# Patient Record
Sex: FEMALE | Race: WHITE | NOT HISPANIC OR LATINO
[De-identification: names, ages, dates, MRNs, and addresses within clinical notes are randomized per-mention and may not be internally consistent; named-entity substitution may affect disease eponyms.]

---

## 2021-12-13 PROBLEM — Z00.00 ENCOUNTER FOR PREVENTIVE HEALTH EXAMINATION: Status: ACTIVE | Noted: 2021-12-13

## 2021-12-17 ENCOUNTER — APPOINTMENT (OUTPATIENT)
Dept: ORTHOPEDIC SURGERY | Facility: CLINIC | Age: 61
End: 2021-12-17
Payer: COMMERCIAL

## 2021-12-17 DIAGNOSIS — M79.662 PAIN IN LEFT LOWER LEG: ICD-10-CM

## 2021-12-17 DIAGNOSIS — Z87.09 PERSONAL HISTORY OF OTHER DISEASES OF THE RESPIRATORY SYSTEM: ICD-10-CM

## 2021-12-17 DIAGNOSIS — Z78.9 OTHER SPECIFIED HEALTH STATUS: ICD-10-CM

## 2021-12-17 PROCEDURE — 73564 X-RAY EXAM KNEE 4 OR MORE: CPT | Mod: RT,LT

## 2021-12-17 PROCEDURE — 99204 OFFICE O/P NEW MOD 45 MIN: CPT

## 2021-12-17 PROCEDURE — 73590 X-RAY EXAM OF LOWER LEG: CPT | Mod: LT

## 2021-12-17 RX ORDER — CHROMIUM 200 MCG
TABLET ORAL
Refills: 0 | Status: ACTIVE | COMMUNITY

## 2021-12-17 RX ORDER — MONTELUKAST 10 MG/1
TABLET, FILM COATED ORAL
Refills: 0 | Status: ACTIVE | COMMUNITY

## 2021-12-17 RX ORDER — CELECOXIB 50 MG/1
CAPSULE ORAL
Refills: 0 | Status: ACTIVE | COMMUNITY

## 2021-12-17 RX ORDER — BUDESONIDE AND FORMOTEROL FUMARATE DIHYDRATE 160; 4.5 UG/1; UG/1
AEROSOL RESPIRATORY (INHALATION)
Refills: 0 | Status: ACTIVE | COMMUNITY

## 2021-12-17 RX ORDER — FAMOTIDINE 40 MG/1
40 TABLET, FILM COATED ORAL
Refills: 0 | Status: ACTIVE | COMMUNITY

## 2021-12-17 NOTE — HISTORY OF PRESENT ILLNESS
[de-identified] : 60yo Woman comes in with pain in her left lower leg and knee that got really bad after a trip and fall on the left knee that occurred about 2 months ago.  Prior to that she has been treated for left knee arthritis and did have a steroid injection done in August and then she had another one done in October after the fall.  She has ongoing pain.  She did have x-rays of the knee in October but never of the lower leg.  There was a lot of bruising and swelling.  Her pain is sharp, achy, burning and cramping about 9-10 out of 10 worse with stairs.  She gets spasms.  Bending is very painful.  She had been prescribed celecoxib.  Her right knee has been hurting more from favoring the left.

## 2021-12-17 NOTE — PHYSICAL EXAM
[de-identified] : Bilateral lower extremities\par Antalgic gait walking on a flexed left knee and valgus malalignment.\par Left knee range of motion is with about a 15 degree flexion contracture and flexion to 90 degrees with pain.\par Right knee range of motion is from almost full extension to 125 to 130 degrees flexion without significant pain\par Tender medial and lateral joint lines left knee.\par There is generalized tenderness through her left lower leg including the fibula starting about 9 to 10 cm above the tip.\par No pitting edema but there may be mild edema through the lower leg.  No lower leg deformity or crepitus.\par  [de-identified] : \par X-rays bilateral knees weightbearing 4 views AP, lateral, merchant and Avila view showed more severe arthritis in the right than the left knee, bone-on-bone patellofemoral right and more moderate left and medial and lateral joint productive changes greater right than left.  No fractures or acute changes seen\par \par X-rays left lower leg tib-fib AP lateral views showed no visible fractures.  Ankle joint is unremarkable.

## 2021-12-17 NOTE — ASSESSMENT
[FreeTextEntry1] : 62 yo woman fell a couple months ago with pain left knee and loss of motion.  She has a history of arthritis which on x-ray looks more severe in her right than the left knee but clinically her left knee motion is very limited and painful now.  I suspect perhaps there is a displaced meniscus tear.  I referred her for an MRI.  I recommended she walk with a cane.  Heat and ice.  She can take the celecoxib once a day and 2 Tylenol once or twice a day if needed.  She was referred to physical therapy.  Heat and ice as needed.  She was given home exercises to work on range of motion and strengthening.  I will call her with the MRI results and I would like to see her back in about 3 to 4 weeks to see if she is making any progress.\par Since she has had several steroid injections, including 1 after the fall I did not want to do more steroids today since it had not helped.  Depending on the MRI findings we may consider a steroid injection versus possible surgery if there is a displaced tear of the meniscus blocking motion.

## 2021-12-25 ENCOUNTER — NON-APPOINTMENT (OUTPATIENT)
Age: 61
End: 2021-12-25

## 2021-12-28 ENCOUNTER — NON-APPOINTMENT (OUTPATIENT)
Age: 61
End: 2021-12-28

## 2022-01-14 ENCOUNTER — APPOINTMENT (OUTPATIENT)
Dept: ORTHOPEDIC SURGERY | Facility: CLINIC | Age: 62
End: 2022-01-14
Payer: COMMERCIAL

## 2022-01-14 PROCEDURE — 20610 DRAIN/INJ JOINT/BURSA W/O US: CPT | Mod: LT

## 2022-01-14 PROCEDURE — 99214 OFFICE O/P EST MOD 30 MIN: CPT | Mod: 25

## 2022-01-14 NOTE — PROCEDURE
[Injection] : Injection [Left] : of the left [Knee Joint] : knee joint [Osteoarthritis] : Osteoarthritis [Inflammation] : Inflammation [Joint Pain] : Joint pain [Patient] : patient [Risk] : risk [Benefits] : benefits [Alternatives] : alternatives [Bleeding] : bleeding [Infection] : infection [Allergic Reaction] : allergic reaction [Verbal Consent Obtained] : verbal consent was obtained prior to the procedure [Alcohol] : Alcohol [Betadine] : Betadine [Ethyl Chloride Spray] : ethyl chloride spray was used as a topical anesthetic [22] : a 22-gauge [1% Lidocaine___(mL)] : [unfilled] mL of 1% Lidocaine [Triamcinolone 40mg/mL___(mL)] : [unfilled] ~UmL of 40mg/mL triamcinolone [Bandage Applied] : a bandage [Tolerated Well] : The patient tolerated the procedure well [None] : none [No Strenuous Activity___day(s)] : avoid strenuous activity for [unfilled] day(s)

## 2022-02-04 ENCOUNTER — APPOINTMENT (OUTPATIENT)
Dept: ORTHOPEDIC SURGERY | Facility: CLINIC | Age: 62
End: 2022-02-04
Payer: COMMERCIAL

## 2022-02-10 ENCOUNTER — APPOINTMENT (OUTPATIENT)
Dept: ORTHOPEDIC SURGERY | Facility: CLINIC | Age: 62
End: 2022-02-10
Payer: COMMERCIAL

## 2022-02-10 PROCEDURE — 99213 OFFICE O/P EST LOW 20 MIN: CPT

## 2022-02-10 NOTE — ASSESSMENT
[FreeTextEntry1] : 61 yo woman fell 4 months ago with pain left knee and loss of motion.  There is osteoarthritis left knee with severe degenerative changes patellofemoral joint and also areas of lateral full-thickness cartilage wear and mucoid degeneration ACL and a complex tear of the lateral meniscus and small tear in the medial meniscus.\par She has more severe arthritis in the right knee.  \par She is not sure if she has had a chronic flexion contracture or if this just started several months ago.\par There has been some improvement.  I encouraged her to continue working on the knee motion particularly getting it straighter.  Pain seems less and she is able to work on it better.  The MRI did not seem to show anything that would be blocking motion.\par She can take Tylenol and ibuprofen or another anti-inflammatory as needed.\par With the arthritis I ordered hyaluronic acid injection to see if this would give her better relief from the arthritis   And help improve her motion and function.\par We will work on authorizing that and she will follow-up in about a month.

## 2022-02-10 NOTE — PHYSICAL EXAM
[de-identified] : Bilateral lower extremities\par Antalgic gait walking on a flexed left knee and valgus malalignment.  Flexion contracture left knee\par Left knee range of motion is with about a 10-15 degree flexion contracture and flexion to 120 degrees with discomfort but much less pain\par Right knee range of motion is from almost full extension to 125 to 130 degrees flexion without significant pain\par Tender mildly medial and lateral joint lines left knee less than it had been.\par Minimally tender left calf\par No pitting edema but there may be mild edema through the lower leg.  No lower leg deformity or crepitus.  Normal varus and valgus laxity.  Ia Lachman.\par Mild pain with Reese left knee.\par Motor and sensation are intact distally [de-identified] : Varicose veins [de-identified] : Overweight [de-identified] : \par X-rays bilateral knees weightbearing 4 views AP, lateral, merchant and Avila view showed more severe arthritis in the right than the left knee, bone-on-bone patellofemoral right and more moderate left and medial and lateral joint productive changes greater right than left.  No fractures or acute changes seen\par \par X-rays left lower leg tib-fib AP lateral views showed no visible fractures.  Ankle joint is unremarkable.\par \par MRI of the left knee performed December 23, 2021 showed severe lateral patellofemoral arthritis, severe mucoid degeneration ACL, small complex tear posterior horn medial meniscus, complex tear posterior horn and body of the lateral meniscus, 10 mm area of severe chondral loss posterior weightbearing lateral femoral condyle, moderate popliteal cyst and small joint effusion.

## 2022-02-10 NOTE — PHYSICAL EXAM
[LE] : Sensory: Intact in bilateral lower extremities [Normal RLE] : Right Lower Extremity: No scars, rashes, lesions, ulcers, skin intact [Normal LLE] : Left Lower Extremity: No scars, rashes, lesions, ulcers, skin intact [Normal Touch] : sensation intact for touch [Normal] : Alert and in no acute distress [de-identified] : Bilateral lower extremities\par Antalgic gait walking on a flexed left knee and valgus malalignment.  Flexion contracture left knee\par Left knee range of motion is with about a 15-20 degree flexion contracture and flexion to 90 degrees with pain.\par Right knee range of motion is from almost full extension to 125 to 130 degrees flexion without significant pain\par Tender medial and lateral joint lines left knee.\par There is generalized tenderness through her left calf but she also has some right calf pain and just seems to be tender in that area.\par No pitting edema but there may be mild edema through the lower leg.  No lower leg deformity or crepitus.  Normal varus and valgus laxity.  Ia Lachman.\par Mild pain with Reese left knee. [de-identified] : Varicose veins [de-identified] : Overweight [de-identified] : \par X-rays bilateral knees weightbearing 4 views AP, lateral, merchant and Avila view showed more severe arthritis in the right than the left knee, bone-on-bone patellofemoral right and more moderate left and medial and lateral joint productive changes greater right than left.  No fractures or acute changes seen\par \par X-rays left lower leg tib-fib AP lateral views showed no visible fractures.  Ankle joint is unremarkable.\par \par MRI of the left knee performed December 23, 2021 showed severe lateral patellofemoral arthritis, severe mucoid degeneration ACL, small complex tear posterior horn medial meniscus, complex tear posterior horn and body of the lateral meniscus, 10 mm area of severe chondral loss posterior weightbearing lateral femoral condyle, moderate popliteal cyst and small joint effusion.

## 2022-02-10 NOTE — HISTORY OF PRESENT ILLNESS
[de-identified] : 61 yo Woman comes in with history of bilateral knee osteoarthritis right greater than left comes in for f/u. Steroid injection was done in the left knee 3 wks ago which helped and pain is somewhat less.. \par She went to physical therapy.  \par She feels there has been some improvement.

## 2022-02-10 NOTE — ASSESSMENT
[FreeTextEntry1] : 61 yo woman fell 3 months ago with pain left knee and loss of motion.  There is osteoarthritis left knee with severe degenerative changes patellofemoral joint and also areas of lateral full-thickness cartilage wear and mucoid degeneration ACL and a complex tear of the lateral meniscus and small tear in the medial meniscus.\par She has more severe arthritis in the right knee.  What is most concerning is the loss of motion both flexion contracture and loss of flexion but particularly the inability to fully straighten her knee.  She has had 2 other cortisone injections in the last 6 months prior to seeing me and had a couple of few years ago.  Given her lack of motion without an obvious tear or something blocking her motion I recommended trying 1 more injection of steroids which we did today.  Following the injection she was able to extend her knee another 5 degrees and had much less pain.  She could walk better as well.  Hopefully this allows her to rehabilitate her knee better with the therapy and I encouraged her to do exercises every day on her own working on getting the knee to go as straight as possible and to do leg lifts to strengthen.\par If she has ongoing pain and loss of motion we may consider arthroscopic surgery.  Also given the arthritis and the fact she has had 3 steroid injections in the past 6 months and with the arthritis I recommended ordering hyaluronic acid injection to see if this helps.  With pain and motion and also helps us to avoid surgery at least in the short run.  As the arthritis gets worse at some point in the future knee replacement may be considered.

## 2022-02-10 NOTE — HISTORY OF PRESENT ILLNESS
[de-identified] : 62 yo Woman comes in with history of bilateral knee osteoarthritis right greater than left with increased left knee pain and flexion contracture.\par She went to physical therapy.  MRI showed degenerative changes and degenerative type meniscus tears.

## 2022-03-04 ENCOUNTER — APPOINTMENT (OUTPATIENT)
Dept: ORTHOPEDIC SURGERY | Facility: CLINIC | Age: 62
End: 2022-03-04
Payer: COMMERCIAL

## 2022-03-04 PROCEDURE — 20610 DRAIN/INJ JOINT/BURSA W/O US: CPT | Mod: LT

## 2022-03-04 NOTE — PROCEDURE
[Injection] : Injection [Left] : of the left [Knee Joint] : knee joint [Osteoarthritis] : Osteoarthritis [Patient] : patient [Risk] : risk [Benefits] : benefits [Alternatives] : alternatives [Bleeding] : bleeding [Infection] : infection [Allergic Reaction] : allergic reaction [Verbal Consent Obtained] : verbal consent was obtained prior to the procedure [Alcohol] : Alcohol [Betadine] : Betadine [Ethyl Chloride Spray] : ethyl chloride spray was used as a topical anesthetic [Lateral] : lateral [20] : a 20-gauge [Bandage Applied] : a bandage [Tolerated Well] : The patient tolerated the procedure well [None] : none [No Strenuous Activity___day(s)] : avoid strenuous activity for [unfilled] day(s) [___ Week(s)] : in [unfilled] week(s) [FreeTextEntry8] : Durolane 3ml, lot [id number]. exp 8/31/24

## 2022-04-10 ENCOUNTER — TRANSCRIPTION ENCOUNTER (OUTPATIENT)
Age: 62
End: 2022-04-10

## 2022-05-26 ENCOUNTER — TRANSCRIPTION ENCOUNTER (OUTPATIENT)
Age: 62
End: 2022-05-26

## 2022-05-26 ENCOUNTER — APPOINTMENT (OUTPATIENT)
Dept: ORTHOPEDIC SURGERY | Facility: CLINIC | Age: 62
End: 2022-05-26
Payer: COMMERCIAL

## 2022-05-26 DIAGNOSIS — S80.02XA CONTUSION OF LEFT KNEE, INITIAL ENCOUNTER: ICD-10-CM

## 2022-05-26 PROCEDURE — 73564 X-RAY EXAM KNEE 4 OR MORE: CPT | Mod: LT

## 2022-05-26 PROCEDURE — 99214 OFFICE O/P EST MOD 30 MIN: CPT

## 2022-05-26 NOTE — ASSESSMENT
[FreeTextEntry1] : 63 yo woman fell 6 months ago with pain left knee and loss of motion.  There is osteoarthritis left knee with severe degenerative changes patellofemoral joint and also areas of lateral full-thickness cartilage wear and mucoid degeneration ACL and a complex tear of the lateral meniscus and small tear in the medial meniscus.\par She has more severe arthritis in the right knee.  \par And surprised that the contractures in the knee have not improved more.  As she was in the office I had her do exercises at motion did not seem to improve.  I spoke to her about surgical treatment options which would be either knee replacement versus doing an arthroscopic debridement of the meniscus tears and then rehabbing to see if she gets back more motion.  I would lean towards the latter although there is a chance it does not improve and eventually does go on to a knee replacement.  She is relatively young and supple that would be a reason to try to last aggressive treatment first.\par She is not anxious to do arthroscopy quite yet either.  She is would prefer to try more therapy.  I encouraged her to do exercises every day on her own and to go for therapy.  Anti-inflammatories as needed --she takes Celebrex.\par Heat and ice.\par Follow-up in 6 weeks.  If she does not make any improvement with her motion and I would encourage her to likely proceed with arthroscopy.

## 2022-05-26 NOTE — HISTORY OF PRESENT ILLNESS
[de-identified] : 62 year old woman comes in for follow up for bilateral knee pain.\par She states that she fell after she had the injection in her left knee and  aggravated the knee somewhat.\par She has done therapy and has done a little bit of exercises on her own but not quite as consistent as she should have\par \par She had a Durolane injection for LEFT knee on 3/4/22 that helped her pain partially.  She still has pain that is 5 out of 510 but not quite as bad or as frequent as she had before.\par She had a steroid injection in the LEFT knee on 1/14/22 that helped

## 2022-05-26 NOTE — PHYSICAL EXAM
[LE] : Sensory: Intact in bilateral lower extremities [Normal RLE] : Right Lower Extremity: No scars, rashes, lesions, ulcers, skin intact [Normal LLE] : Left Lower Extremity: No scars, rashes, lesions, ulcers, skin intact [Normal Touch] : sensation intact for touch [Normal] : Alert and in no acute distress [Slightly Antalgic] : slightly antalgic [de-identified] : Bilateral lower extremities/knees\par Mildly antalgic gait walking on a flexed left knee and valgus malalignment.  Flexion contracture left knee\par Left knee range of motion is with about a 15 degree flexion contracture and flexion to 110 degrees with discomfort but much less pain\par Right knee range of motion is from almost full extension to 125 to 130 degrees flexion without significant pain\par Tender mildly medial and lateral joint lines left knee.\par Minimally tender left calf\par No pitting edema but there may be mild edema through the lower leg.  No lower leg deformity or crepitus.  Normal varus and valgus laxity.  Ia Lachman.\par Mild pain with Reese left knee.\par Motor and sensation are intact distally [de-identified] : Varicose veins [de-identified] : Overweight [de-identified] : \par X-rays bilateral knees weightbearing 4 views AP, lateral, merchant and Avila views today compared to x-rays in December 2021 showed severe bone-on-bone arthritis in the right knee patellofemoral joint and moderate to severe in the left knee and medial and lateral joint productive changes greater right than left.  No fractures or acute changes seen\par \par X-rays left lower leg tib-fib AP lateral views showed no visible fractures.  Ankle joint is unremarkable.\par \par MRI of the left knee performed December 23, 2021 showed severe lateral patellofemoral arthritis, severe mucoid degeneration ACL, small complex tear posterior horn medial meniscus, complex tear posterior horn and body of the lateral meniscus, 10 mm area of severe chondral loss posterior weightbearing lateral femoral condyle, moderate popliteal cyst and small joint effusion.

## 2022-06-10 ENCOUNTER — APPOINTMENT (OUTPATIENT)
Dept: ENDOCRINOLOGY | Facility: CLINIC | Age: 62
End: 2022-06-10
Payer: COMMERCIAL

## 2022-06-10 ENCOUNTER — LABORATORY RESULT (OUTPATIENT)
Age: 62
End: 2022-06-10

## 2022-06-10 VITALS
SYSTOLIC BLOOD PRESSURE: 122 MMHG | BODY MASS INDEX: 29.23 KG/M2 | WEIGHT: 145 LBS | DIASTOLIC BLOOD PRESSURE: 79 MMHG | HEART RATE: 75 BPM | HEIGHT: 59 IN

## 2022-06-10 DIAGNOSIS — R32 UNSPECIFIED URINARY INCONTINENCE: ICD-10-CM

## 2022-06-10 PROCEDURE — 99205 OFFICE O/P NEW HI 60 MIN: CPT

## 2022-06-10 RX ORDER — PNV NO.95/FERROUS FUM/FOLIC AC 28MG-0.8MG
TABLET ORAL
Refills: 0 | Status: ACTIVE | COMMUNITY

## 2022-06-10 RX ORDER — ALBUTEROL SULFATE 90 UG/1
AEROSOL, METERED RESPIRATORY (INHALATION)
Refills: 0 | Status: ACTIVE | COMMUNITY

## 2022-06-11 ENCOUNTER — NON-APPOINTMENT (OUTPATIENT)
Age: 62
End: 2022-06-11

## 2022-06-13 LAB
25(OH)D3 SERPL-MCNC: 35 NG/ML
ALBUMIN SERPL ELPH-MCNC: 4.4 G/DL
ALP BLD-CCNC: 93 U/L
ALT SERPL-CCNC: 23 U/L
ANION GAP SERPL CALC-SCNC: 14 MMOL/L
AST SERPL-CCNC: 17 U/L
BASOPHILS # BLD AUTO: 0.03 K/UL
BASOPHILS NFR BLD AUTO: 0.4 %
BILIRUB SERPL-MCNC: 0.3 MG/DL
BUN SERPL-MCNC: 19 MG/DL
CALCIUM SERPL-MCNC: 9.7 MG/DL
CALCIUM SERPL-MCNC: 9.7 MG/DL
CHLORIDE SERPL-SCNC: 103 MMOL/L
CO2 SERPL-SCNC: 26 MMOL/L
CREAT SERPL-MCNC: 0.5 MG/DL
EGFR: 107 ML/MIN/1.73M2
EOSINOPHIL # BLD AUTO: 0.07 K/UL
EOSINOPHIL NFR BLD AUTO: 0.9 %
GLUCOSE SERPL-MCNC: 82 MG/DL
HCT VFR BLD CALC: 47 %
HGB BLD-MCNC: 14.8 G/DL
IMM GRANULOCYTES NFR BLD AUTO: 0.5 %
LYMPHOCYTES # BLD AUTO: 1.12 K/UL
LYMPHOCYTES NFR BLD AUTO: 14.9 %
MAGNESIUM SERPL-MCNC: 2.2 MG/DL
MAN DIFF?: NORMAL
MCHC RBC-ENTMCNC: 27.8 PG
MCHC RBC-ENTMCNC: 31.5 GM/DL
MCV RBC AUTO: 88.3 FL
MONOCYTES # BLD AUTO: 0.5 K/UL
MONOCYTES NFR BLD AUTO: 6.6 %
NEUTROPHILS # BLD AUTO: 5.76 K/UL
NEUTROPHILS NFR BLD AUTO: 76.7 %
PARATHYROID HORMONE INTACT: 50 PG/ML
PHOSPHATE SERPL-MCNC: 4 MG/DL
PLATELET # BLD AUTO: 153 K/UL
POTASSIUM SERPL-SCNC: 5.4 MMOL/L
PROT SERPL-MCNC: 7 G/DL
RBC # BLD: 5.32 M/UL
RBC # FLD: 13.3 %
SODIUM SERPL-SCNC: 142 MMOL/L
TSH SERPL-ACNC: 1.12 UIU/ML
TTG IGA SER IA-ACNC: <1.2 U/ML
TTG IGA SER-ACNC: NEGATIVE
TTG IGG SER IA-ACNC: 2.4 U/ML
TTG IGG SER IA-ACNC: NEGATIVE
WBC # FLD AUTO: 7.52 K/UL

## 2022-06-14 LAB
ALP BONE SERPL-MCNC: 19 UG/L
OSTEOCALCIN SERPL-MCNC: 13.1 NG/ML

## 2022-06-15 LAB
ALBUMIN MFR SERPL ELPH: 57.7 %
ALBUMIN SERPL-MCNC: 4.1 G/DL
ALBUMIN/GLOB SERPL: 1.4 RATIO
ALPHA1 GLOB MFR SERPL ELPH: 4.1 %
ALPHA1 GLOB SERPL ELPH-MCNC: 0.3 G/DL
ALPHA2 GLOB MFR SERPL ELPH: 12.2 %
ALPHA2 GLOB SERPL ELPH-MCNC: 0.9 G/DL
B-GLOBULIN MFR SERPL ELPH: 13.1 %
B-GLOBULIN SERPL ELPH-MCNC: 0.9 G/DL
GAMMA GLOB FLD ELPH-MCNC: 0.9 G/DL
GAMMA GLOB MFR SERPL ELPH: 12.9 %
INTERPRETATION SERPL IEP-IMP: NORMAL
M PROTEIN MFR SERPL ELPH: 2.5 %
MONOCLON BAND OBS SERPL: 0.2 G/DL
PROT SERPL-MCNC: 7.1 G/DL
PROT SERPL-MCNC: 7.1 G/DL

## 2022-06-16 ENCOUNTER — NON-APPOINTMENT (OUTPATIENT)
Age: 62
End: 2022-06-16

## 2022-06-18 ENCOUNTER — NON-APPOINTMENT (OUTPATIENT)
Age: 62
End: 2022-06-18

## 2022-06-20 ENCOUNTER — LABORATORY RESULT (OUTPATIENT)
Age: 62
End: 2022-06-20

## 2022-06-20 ENCOUNTER — APPOINTMENT (OUTPATIENT)
Dept: HEMATOLOGY ONCOLOGY | Facility: CLINIC | Age: 62
End: 2022-06-20
Payer: COMMERCIAL

## 2022-06-20 VITALS
HEIGHT: 59 IN | OXYGEN SATURATION: 97 % | HEART RATE: 77 BPM | BODY MASS INDEX: 30.04 KG/M2 | WEIGHT: 149 LBS | SYSTOLIC BLOOD PRESSURE: 122 MMHG | TEMPERATURE: 96.8 F | DIASTOLIC BLOOD PRESSURE: 72 MMHG

## 2022-06-20 DIAGNOSIS — Z84.89 FAMILY HISTORY OF OTHER SPECIFIED CONDITIONS: ICD-10-CM

## 2022-06-20 DIAGNOSIS — Z82.79 FAMILY HISTORY OF OTHER CONGENITAL MALFORMATIONS, DEFORMATIONS AND CHROMOSOMAL ABNORMALITIES: ICD-10-CM

## 2022-06-20 LAB
ALBUPE: 19 %
ALPHA1UPE: 37.8 %
ALPHA2UPE: 19.9 %
BETAUPE: 15 %
GAMMAUPE: 8.3 %
IGA 24H UR QL IFE: NORMAL
KAPPA LC 24H UR QL: NORMAL
PROT PATTERN 24H UR ELPH-IMP: NORMAL
PROT UR-MCNC: 8 MG/DL
PROT UR-MCNC: 8 MG/DL

## 2022-06-20 PROCEDURE — 99204 OFFICE O/P NEW MOD 45 MIN: CPT | Mod: 25

## 2022-06-20 PROCEDURE — 36415 COLL VENOUS BLD VENIPUNCTURE: CPT

## 2022-06-20 NOTE — ASSESSMENT
[FreeTextEntry1] : Osteoporosis. She has no history of fragility fracture. She has no history of prior osteoporosis therapy. We discussed completion of a metabolic evaluation for secondary causes of bone loss. We discussed the potential benefits and risks of the osteoanabolic and antiresorptive classes of pharmacologic osteoporosis therapy. Since she has a bone density T-score more than 3.5 standard deviations below normal, we focused on the osteoanabolic class. We discussed the potential benefits and risks of romosozumab, including but not limited to osteonecrosis of the jaw, atypical femoral fracture, cardiovascular risk including heart attack and stroke. She is amenable to therapy with romosozumab pending further evaluation and insurance authorization. We discussed the need for antiresorptive therapy after a course of osteoanabolic therapy so she does not lose the bone that was gained with treatment. She declined consideration of PTH/PTHrP analog therapy due to the need for self injection. \par Metabolic evaluation for secondary causes of osteoporosis\par Calcium 1200 mg daily from diet and supplements (to be taken in divided doses as no more than 500-600 mg can be absorbed at one time); advised increased dietary and/or supplemental calcium\par Vitamin D at least 800 intl units daily or more pending level\par Diet, exercise and fall prevention discussed\par \par History of neurofibromatosis type 1. She has no symptoms of pheochromocytoma and screening is not necessary at this time. \par \par I reviewed the DXA performed on December 15, 2021 with the patient today.\par I reviewed the laboratories performed on November 19, 2021 with the patient today. \par I counseled the patient regarding calcium and vitamin D intake today.\par I discussed the following osteoporosis therapies: Alendronate, risedronate, ibandronate, zoledronic acid, denosumab, teriparatide, abaloparatide, romosozumab \par \par CC:\par Dr. Lake Mcgee, Fax [phone]

## 2022-06-20 NOTE — DATA REVIEWED
[FreeTextEntry1] : Laboratories (November 19, 2021) reviewed and significant for: \par Unremarkable complete blood count\par Calcium 9.3 mg/dL (albumin 4.3 g/dL)\par BUN/creatinine 16/0.58 mg/dL (eGFR 106 mL/min)\par Alkaline phosphatase 86 U/L\par 25-hydroxyvitamin D 51.7 ng/dL\par \par Most recent bone mineral density\par Date: December 15, 2021\par Source: [employer]\par Site: [facility] - under Nancy Mikhail Rubinstein\par \par Site	BMD (g/cm2)	T-score	Change previous	Change baseline	\par Lumbar spine	0.597	-4.1\par Femoral neck	0.598	-2.3	\par Total hip	                0.703       -2.0         \par Distal radius           	                \par DXA Comments:

## 2022-06-20 NOTE — ADDENDUM
[FreeTextEntry1] : Recent laboratory results as below; discussed with Ms. Rubinstein. Red blood cell count, hemoglobin, and potassium borderline elevated likely due to dehydration. Serum protein electrophoresis with a monoclonal protein. I referred her to hematology providers within the [facility] system. She is no longer comfortable with romosozumab and prefers denosumab pending insurance authorization. We discussed the risks of antiresorptive therapy, including but not limited to osteonecrosis of the jaw and atypical femoral fracture. 6/15/22

## 2022-06-20 NOTE — PHYSICAL EXAM
[Alert] : alert [Healthy Appearance] : healthy appearance [No Acute Distress] : no acute distress [Normal Sclera/Conjunctiva] : normal sclera/conjunctiva [Normal Hearing] : hearing was normal [No Neck Mass] : no neck mass was observed [No LAD] : no lymphadenopathy [Supple] : the neck was supple [Thyroid Not Enlarged] : the thyroid was not enlarged [No Respiratory Distress] : no respiratory distress [Clear to Auscultation] : lungs were clear to auscultation bilaterally [Normal S1, S2] : normal S1 and S2 [Normal Rate] : heart rate was normal [Regular Rhythm] : with a regular rhythm [No Spinal Tenderness] : no spinal tenderness [No Stigmata of Cushings Syndrome] : no stigmata of Cushings Syndrome [Normal Gait] : normal gait [Normal Insight/Judgement] : insight and judgment were intact [Kyphosis] : no kyphosis present [Scoliosis] : no scoliosis [Acanthosis Nigricans] : no acanthosis nigricans [de-identified] : no moon facies, no supraclavicular fat pads [de-identified] : no difficulty balancing on one leg bilaterally

## 2022-06-20 NOTE — HISTORY OF PRESENT ILLNESS
[FreeTextEntry1] : Ms. Rubinstein is a 62 year-old woman with a history of neurofibromatosis 1 presenting to establish care with me for osteoporosis.\par \par Bone History\par Menopause: Age 56\par Osteoporosis diagnosed in 2017 on routine bone density significant for T-scores of -3.4 at the lumbar spine, -1.8 at the femoral neck, -1.9 at the total hip, -0.6 at the distal radius; most recent bone density as below\par Fracture history: None\par Family history: No parental history of hip fracture\par Treatment: None\par \par Falls: Two falls on the street\par Height loss: About 1/2 inch\par Kidney stones: No\par Dental health: Regular appointments, no history of implants, no upcoming procedures planned\par Exercise: Walks at least 30 minutes most days\par Dairy intake: 1/2-1 serving daily (milk with coffee, cheese daily, occasional yogurt)\par Calcium supplements: None; was taking Os-Jayson and will be starting Solgar Calcium Citrate + D\par Multivitamin: Solgar VM-75 with chelated minerals (calcium 20 mg, vitamin D 400 intl units)\par Vitamin D supplements: 50,000 intl units weekly\par \par Osteoporosis risk factors include: Postmenopausal status,  race, prior fracture, falls, height loss, small thin bones, tobacco use, excessive alcohol, anorexia, family history, vitamin D deficiency, corticosteroid use, seizure medications, malabsorption, hyperparathyroidism, hyperthyroidism.\par NEGATIVE EXCEPT: Postmenopausal status,  race\par \par She has no history of hypertension. No episodes of headache, palpitations, diaphoresis. CVA (cerebrovascular accident)

## 2022-06-21 LAB
ALBUMIN SERPL ELPH-MCNC: 4.8 G/DL
ALP BLD-CCNC: 96 U/L
ALT SERPL-CCNC: 26 U/L
ANION GAP SERPL CALC-SCNC: 17 MMOL/L
AST SERPL-CCNC: 19 U/L
B2 MICROGLOB SERPL-MCNC: 1.4 MG/L
BASOPHILS # BLD AUTO: 0 K/UL
BASOPHILS # BLD AUTO: 0 K/UL
BASOPHILS NFR BLD AUTO: 0 %
BASOPHILS NFR BLD AUTO: 0 %
BILIRUB SERPL-MCNC: 0.2 MG/DL
BUN SERPL-MCNC: 20 MG/DL
CALCIUM SERPL-MCNC: 9.9 MG/DL
CHLORIDE SERPL-SCNC: 101 MMOL/L
CO2 SERPL-SCNC: 23 MMOL/L
CREAT SERPL-MCNC: 0.45 MG/DL
CRP SERPL-MCNC: 4 MG/L
DEPRECATED KAPPA LC FREE/LAMBDA SER: 1.55 RATIO
EGFR: 109 ML/MIN/1.73M2
EOSINOPHIL # BLD AUTO: 0.16 K/UL
EOSINOPHIL # BLD AUTO: 0.16 K/UL
EOSINOPHIL NFR BLD AUTO: 1.7 %
EOSINOPHIL NFR BLD AUTO: 1.7 %
ERYTHROCYTE [SEDIMENTATION RATE] IN BLOOD BY WESTERGREN METHOD: 36 MM/HR
GLUCOSE SERPL-MCNC: 95 MG/DL
HCT VFR BLD CALC: 46.6 %
HGB BLD-MCNC: 15.1 G/DL
KAPPA LC CSF-MCNC: 1.01 MG/DL
KAPPA LC SERPL-MCNC: 1.57 MG/DL
LDH SERPL-CCNC: 191 U/L
LYMPHOCYTES # BLD AUTO: 2.16 K/UL
LYMPHOCYTES # BLD AUTO: 2.16 K/UL
LYMPHOCYTES NFR BLD AUTO: 23.5 %
LYMPHOCYTES NFR BLD AUTO: 23.5 %
MAN DIFF?: NORMAL
MCHC RBC-ENTMCNC: 28 PG
MCHC RBC-ENTMCNC: 32.4 GM/DL
MCV RBC AUTO: 86.5 FL
MONOCYTES # BLD AUTO: 0.48 K/UL
MONOCYTES # BLD AUTO: 0.48 K/UL
MONOCYTES NFR BLD AUTO: 5.2 %
MONOCYTES NFR BLD AUTO: 5.2 %
MSMEAR: NORMAL
NEUTROPHILS # BLD AUTO: 6.39 K/UL
NEUTROPHILS # BLD AUTO: 6.39 K/UL
NEUTROPHILS NFR BLD AUTO: 69.6 %
NEUTROPHILS NFR BLD AUTO: 69.6 %
PLAT MORPH BLD: NORMAL
PLATELET # BLD AUTO: 149 K/UL
POTASSIUM SERPL-SCNC: 4.4 MMOL/L
PROT SERPL-MCNC: 7.7 G/DL
RBC # BLD: 5.39 M/UL
RBC # FLD: 13.1 %
RBC BLD AUTO: NORMAL
SODIUM SERPL-SCNC: 142 MMOL/L
URATE SERPL-MCNC: 3.1 MG/DL
WBC # FLD AUTO: 9.18 K/UL

## 2022-06-22 LAB
ALBUMIN MFR SERPL ELPH: 58.1 %
ALBUMIN SERPL-MCNC: 4.5 G/DL
ALBUMIN/GLOB SERPL: 1.4 RATIO
ALPHA1 GLOB MFR SERPL ELPH: 4.3 %
ALPHA1 GLOB SERPL ELPH-MCNC: 0.3 G/DL
ALPHA2 GLOB MFR SERPL ELPH: 12.2 %
ALPHA2 GLOB SERPL ELPH-MCNC: 0.9 G/DL
B-GLOBULIN MFR SERPL ELPH: 12.4 %
B-GLOBULIN SERPL ELPH-MCNC: 1 G/DL
DEPRECATED KAPPA LC FREE/LAMBDA SER: 1.55 RATIO
GAMMA GLOB FLD ELPH-MCNC: 1 G/DL
GAMMA GLOB MFR SERPL ELPH: 13 %
IGA SER QL IEP: 321 MG/DL
IGG SER QL IEP: 946 MG/DL
IGM SER QL IEP: 112 MG/DL
INTERPRETATION SERPL IEP-IMP: NORMAL
KAPPA LC CSF-MCNC: 1.01 MG/DL
KAPPA LC SERPL-MCNC: 1.57 MG/DL
M PROTEIN MFR SERPL ELPH: 2.8 %
M PROTEIN SPEC IFE-MCNC: NORMAL
MONOCLON BAND OBS SERPL: 0.2 G/DL
PROT SERPL-MCNC: 7.7 G/DL
PROT SERPL-MCNC: 7.7 G/DL

## 2022-06-23 LAB
FREE KAPPA URINE: 15.87 MG/L
FREE KAPPA/LAMDA RATIO: 6.27 RATIO
FREE LAMDA URINE: 2.53 MG/L
VISCOSITY, SERUM: 1.7

## 2022-06-25 LAB
ALBUPE: 25.8 %
ALBUPE: 25.8 %
ALPHA1UPE: 32.3 %
ALPHA1UPE: 32.3 %
ALPHA2UPE: 18.1 %
ALPHA2UPE: 18.1 %
BETAUPE: 15.2 %
BETAUPE: 15.2 %
CREAT 24H UR-MCNC: NORMAL G/24 H
CREATININE UR (MAYO): 31 MG/DL
GAMMAUPE: 8.6 %
GAMMAUPE: 8.6 %
IGA 24H UR QL IFE: NORMAL
IGA 24H UR QL IFE: NORMAL
KAPPA LC 24H UR QL: NORMAL
KAPPA LC 24H UR QL: NORMAL
PROT PATTERN 24H UR ELPH-IMP: NORMAL
PROT PATTERN 24H UR ELPH-IMP: NORMAL
PROT UR-MCNC: 13 MG/DL
PROT UR-MCNC: 13 MG/DL
PROT UR-MCNC: 7 MG/DL
PROT UR-MCNC: 7 MG/DL
SPECIMEN VOL 24H UR: NORMAL ML

## 2022-06-27 NOTE — ASSESSMENT
[FreeTextEntry1] : Patient is a 62 year old female with a history of neurofibromatosis type 1, osteoporosis,  osteoarthritis, who is referred for evaluation of monoclonal gammopathy found on recent testing.  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  Will further evaluate.  Have ordered CBC, sedimentation rate, manual differential, comprehensive metabolic panel, LDH, uric acid, serum protein electrophoresis, immunoelectrophoresis, immunofixation, CRP, beta-2 microglobulin, serum free light chains, urine immunoelectrophoresis/immunofixation.  Venipuncture and testing was performed in the office today.  Have advised patient to call the office to discuss results, further recommendations and next appointment date.

## 2022-06-27 NOTE — REASON FOR VISIT
[Initial Consultation] : an initial consultation for [FreeTextEntry2] : IgG monoclonal gammopathy of undetermined significance

## 2022-06-27 NOTE — PHYSICAL EXAM
[Normal] : affect appropriate [de-identified] : Bilateral lower extremity varicosities [de-identified] : slightly limited motion of L knee [de-identified] : Multiple cafe au lait spots on trunk and extremities

## 2022-06-27 NOTE — HISTORY OF PRESENT ILLNESS
[de-identified] : Patient is a 62 year old female with a history of neurofibromatosis type 1, osteoporosis,  osteoarthritis, who is referred for evaluation of monoclonal gammopathy found on recent testing. In June of 2022 the patient was found to have a M spike of 0.2 on protein electrophoresis with an IgG kappa band identified on immunofixation. Total protein on the metabolic panel was normal and creatinine was 0.5.  Random urine protein was also normal.  CBC was significant for a normal white count of 7.52, hemoglobin normal at 14.8, hematocrit 47.0, platelet count of 153,000. Patient has arthritis in both knees and complains of pain in the left knee and lower extremity.  She is under the care of an orthopedist.  She is contemplating initiating treatment for her osteoporosis sometime in the near future.. She denies unusual bone pain, fever, chills, bone pain, unintentional weight loss or recent infections.

## 2022-06-27 NOTE — REVIEW OF SYSTEMS
[Fatigue] : fatigue [Recent Change In Weight] : ~T recent weight change [Joint Pain] : joint pain [Joint Stiffness] : joint stiffness [Negative] : Allergic/Immunologic [Fever] : no fever [Chills] : no chills [Night Sweats] : no night sweats [Easy Bleeding] : no tendency for easy bleeding [Easy Bruising] : no tendency for easy bruising [FreeTextEntry2] : Stress induced weight gain [FreeTextEntry9] : Has arthritis in the knees, osteoporosis

## 2022-06-27 NOTE — CONSULT LETTER
[Dear  ___] : Dear  [unfilled], [Consult Letter:] : I had the pleasure of evaluating your patient, [unfilled]. [( Thank you for referring [unfilled] for consultation for _____ )] : Thank you for referring [unfilled] for consultation for [unfilled] [Please see my note below.] : Please see my note below. [Consult Closing:] : Thank you very much for allowing me to participate in the care of this patient.  If you have any questions, please do not hesitate to contact me. [Sincerely,] : Sincerely, [FreeTextEntry3] : Sharon Epps [DrOmar  ___] : Dr. CONNELL

## 2022-07-08 ENCOUNTER — APPOINTMENT (OUTPATIENT)
Dept: ORTHOPEDIC SURGERY | Facility: CLINIC | Age: 62
End: 2022-07-08

## 2022-07-08 ENCOUNTER — APPOINTMENT (OUTPATIENT)
Dept: VASCULAR SURGERY | Facility: CLINIC | Age: 62
End: 2022-07-08

## 2022-07-08 PROCEDURE — 73590 X-RAY EXAM OF LOWER LEG: CPT | Mod: LT

## 2022-07-08 PROCEDURE — 99214 OFFICE O/P EST MOD 30 MIN: CPT

## 2022-07-08 PROCEDURE — 93971 EXTREMITY STUDY: CPT

## 2022-07-08 NOTE — ASSESSMENT
[FreeTextEntry1] : 61 yo woman fell 6 months ago with pain left knee and loss of motion.  The left knee there is severe patellofemoral arthritis and more mild medial and lateral compartment.   there is also tears of the medial and lateral meniscus\par Left knee.  She has a significant flexion contracture and limited flexion which I do not feel is likely from the patellofemoral arthritis and more likely regular meniscus tears.  We have tried extensively to rehabilitate her knee with limited success.  Given the ongoing loss of motion I recommended arthroscopy and debridement of the medial and lateral meniscal tears and the general chondroplasty.  We can see if this hopefully gets her back to better motion and function and pain relief.\par Ultimately this may not work however and there is a chance the arthritis is really the issue and progresses over time and eventually knee replacement may be considered.  Given her relatively young age and the acute onset of the loss of motion after the fall I think it is worth trying the arthroscopy first.  I discussed the risks, benefits and alternatives.  Risks include but are not limited to infection, DVT, persistent pain and progressive arthritis and anesthetic complications.\par I did send her for Doppler ultrasound to rule out DVT given the lower leg pain although I think this will be negative.  She does have varicose veins and can use compression stockings and elevate and see if vascular specialist in the future in a more elective fashion.\par She has a new rash on her left lower leg as well and she should see her dermatologist.  She will preoperative medical clearance.  No NSAIDs or aspirin 1 week prior to surgery.  She COVID testing beforehand 3 to 5 days.\par She has had some back issues and it is possible that some of the left lower leg pain if its not coming from the knee may be radicular from her back.\par \par She should call if there are any questions or concerns and otherwise surgery will be done in about 4 weeks.

## 2022-07-08 NOTE — PHYSICAL EXAM
[Slightly Antalgic] : slightly antalgic [LE] : Sensory: Intact in bilateral lower extremities [Normal RLE] : Right Lower Extremity: No scars, rashes, lesions, ulcers, skin intact [Normal LLE] : Left Lower Extremity: No scars, rashes, lesions, ulcers, skin intact [Normal Touch] : sensation intact for touch [Normal] : Alert and in no acute distress [de-identified] : Left knee and lower leg\par Mildly antalgic gait walking on a flexed left knee and valgus malalignment.  Flexion contracture left knee\par Left knee range of motion is with about a 15 degree flexion contracture and flexion to 90 degrees with pain\par Right knee range of motion is from almost full extension to 125 to 130 degrees flexion without significant pain\par Tender mildly medial and lateral joint lines left knee.\par Varicose veins left lower leg.  There is several red splotchy skin lesions in the lower leg not raised.  I recommended she see the dermatologist for follow-up on these.\par There is no severe focal tenderness in the left lower leg.\par No pitting edema but there may be mild edema through the lower leg.  No lower leg deformity or crepitus.  Normal varus and valgus laxity.  Ia Lachman.\par + pain with Reese left knee.\par Motor and sensation are intact distally [de-identified] : Varicose veins [de-identified] : Overweight [de-identified] : \par X-rays bilateral knees weightbearing 4 views AP, lateral, merchant and Avila views May 26, 2022 compared to x-rays in December 2021 showed severe bone-on-bone arthritis in the right knee patellofemoral joint and moderate to severe in the left knee and medial and lateral joint productive changes greater right than left.  Left knee medial and lateral compartments relatively well-maintained.  No fractures or acute changes seen\par \par X-rays left lower leg tib-fib AP lateral views today showed no visible fractures.  Ankle joint is unremarkable.\par \par MRI of the left knee performed December 23, 2021 showed severe lateral patellofemoral arthritis, severe mucoid degeneration ACL, small complex tear posterior horn medial meniscus, complex tear posterior horn and body of the lateral meniscus, 10 mm area of severe chondral loss posterior weightbearing lateral femoral condyle, moderate popliteal cyst and small joint effusion.

## 2022-07-08 NOTE — HISTORY OF PRESENT ILLNESS
[de-identified] : 62 year old woman comes in for follow up for bilateral knee pain much worse in the left knee which has been going on since around last October or November..  She has done more therapy and time and does not feel like the knee is doing any better.  It still very stiff and gets pain.  She has pain in the lower leg as well that sometimes is very sharp.  It is intermittent.  She is not currently having any significant back pain.  No numbness.  She is being treated with doxycycline for some issue with her neck that the dermatologist was treating.  Since then she has developed a bit of a rash and a lot of itching in her left lower leg.\par \par She had a Durolane injection for LEFT knee on 3/4/22 that helped her pain partially but just temporarily..  \par Steroid injection also just helped temporarily but no long-lasting relief.  That was about 6 months ago.  She has difficulty with stairs and bending.  Walking can be painful.

## 2022-07-09 ENCOUNTER — NON-APPOINTMENT (OUTPATIENT)
Age: 62
End: 2022-07-09

## 2022-07-11 ENCOUNTER — NON-APPOINTMENT (OUTPATIENT)
Age: 62
End: 2022-07-11

## 2022-07-11 ENCOUNTER — APPOINTMENT (OUTPATIENT)
Dept: ENDOCRINOLOGY | Facility: CLINIC | Age: 62
End: 2022-07-11

## 2022-07-16 ENCOUNTER — NON-APPOINTMENT (OUTPATIENT)
Age: 62
End: 2022-07-16

## 2022-07-18 ENCOUNTER — NON-APPOINTMENT (OUTPATIENT)
Age: 62
End: 2022-07-18

## 2022-07-20 ENCOUNTER — APPOINTMENT (OUTPATIENT)
Dept: ENDOCRINOLOGY | Facility: CLINIC | Age: 62
End: 2022-07-20

## 2022-07-20 VITALS
HEART RATE: 76 BPM | SYSTOLIC BLOOD PRESSURE: 112 MMHG | DIASTOLIC BLOOD PRESSURE: 68 MMHG | WEIGHT: 144 LBS | BODY MASS INDEX: 29.08 KG/M2

## 2022-07-20 PROCEDURE — 96401 CHEMO ANTI-NEOPL SQ/IM: CPT

## 2022-07-20 PROCEDURE — 99214 OFFICE O/P EST MOD 30 MIN: CPT | Mod: 25

## 2022-07-20 RX ORDER — DENOSUMAB 60 MG/ML
60 INJECTION SUBCUTANEOUS
Qty: 1 | Refills: 0 | Status: COMPLETED | OUTPATIENT
Start: 2022-07-20

## 2022-07-20 RX ADMIN — DENOSUMAB 0 MG/ML: 60 INJECTION SUBCUTANEOUS at 00:00

## 2022-07-27 ENCOUNTER — NON-APPOINTMENT (OUTPATIENT)
Age: 62
End: 2022-07-27

## 2022-07-28 LAB
BASOPHILS # BLD AUTO: 0.07 K/UL
BASOPHILS # BLD AUTO: 0.07 K/UL
BASOPHILS NFR BLD AUTO: 0.9 %
BASOPHILS NFR BLD AUTO: 0.9 %
EOSINOPHIL # BLD AUTO: 0 K/UL
EOSINOPHIL # BLD AUTO: 0 K/UL
EOSINOPHIL NFR BLD AUTO: 0 %
EOSINOPHIL NFR BLD AUTO: 0 %
HCT VFR BLD CALC: 46.1 %
HGB BLD-MCNC: 14.7 G/DL
LYMPHOCYTES # BLD AUTO: 1.29 K/UL
LYMPHOCYTES # BLD AUTO: 1.29 K/UL
LYMPHOCYTES NFR BLD AUTO: 15.5 %
LYMPHOCYTES NFR BLD AUTO: 15.5 %
MAN DIFF?: NORMAL
MCHC RBC-ENTMCNC: 27.4 PG
MCHC RBC-ENTMCNC: 31.9 GM/DL
MCV RBC AUTO: 86 FL
MONOCYTES # BLD AUTO: 0.79 K/UL
MONOCYTES # BLD AUTO: 0.79 K/UL
MONOCYTES NFR BLD AUTO: 9.5 %
MONOCYTES NFR BLD AUTO: 9.5 %
MSMEAR: NORMAL
NEUTROPHILS # BLD AUTO: 6.16 K/UL
NEUTROPHILS # BLD AUTO: 6.16 K/UL
NEUTROPHILS NFR BLD AUTO: 74.1 %
NEUTROPHILS NFR BLD AUTO: 74.1 %
PLAT MORPH BLD: NORMAL
PLATELET # BLD AUTO: 146 K/UL
RBC # BLD: 5.36 M/UL
RBC # FLD: 12.9 %
RBC BLD AUTO: NORMAL
WBC # FLD AUTO: 8.31 K/UL

## 2022-08-01 ENCOUNTER — TRANSCRIPTION ENCOUNTER (OUTPATIENT)
Age: 62
End: 2022-08-01

## 2022-08-01 RX ORDER — CHLORHEXIDINE GLUCONATE 213 G/1000ML
1 SOLUTION TOPICAL DAILY
Refills: 0 | Status: DISCONTINUED | OUTPATIENT
Start: 2022-08-02 | End: 2022-08-02

## 2022-08-01 NOTE — ASU PATIENT PROFILE, ADULT - NSICDXPASTSURGICALHX_GEN_ALL_CORE_FT
PAST SURGICAL HISTORY:  H/O hemorrhoidectomy     H/O removal of cyst Left upper arm and left wrist

## 2022-08-01 NOTE — ASU PATIENT PROFILE, ADULT - TEACHING/LEARNING FACTORS IMPACT ABILITY TO LEARN
Plantar Fascitis - Podiatry Department    Plantar fascitis is an inflammatory condition involving the plantar fascia, a thick band of connective tissue found on the bottom of the foot.  This thick band of tissue extends from the heel to the ball of the foot, and ads in support and stabilization of the foot (especially the arch) during walking and running.    Symptoms involve two areas: the arch and more commonly, the inside heel area.  Severe pain can be present, especially in the morning on arising and after periods of rest.  This pain usually decreases somewhat with initial activity and then, as the day progresses, can become quite painful, depending on the activity level.  Swelling, redness and heat are usually not apparent.  Many patients use the term \"stone bruise\" to describe this condition because of the intense, localized area of discomfort.    The most common cause of plantar fascitis is overuse, an excessive amount of activity over a given period of time.  This excessive activity causes a stretching or pulling force on the heel area, can resulting in a fatigue failure or breakdown of the facial tissue.  Over a period of time, this traction, or pulling force can result in heel spur formation, which can be clearly seen on x-rays.    Treatment initially consists of:  · Supportive/stable shoes.  · Stretching exercises to stretch the Achilles tendon and plantar fascia and applicationof moist heat (i.e. A hot bath).  This should be done in the morning or before activity.  Ice massage application after activity.  · Temporary over the counter (OTC) arch supports.  · The use of oral anti-inflammatory agents (Advil, Nuprin, or Aspirin).    Four to twelve weeks is usually the amount of time necessary for symptoms to improve significantly.  In resistant cases the following may be necessary to obtain a cure:   · Custom orthotics.  · Local anesthetic steroid injections.  · Night splint.    · Physical therapy.  · Heel  cups.  · Surgery.    Conservative treatment is curative 95% of the time for this problem.  During treatment activity to tolerance using pain as a guide is recommended.    Patient Education     Healthy Meals for Diabetes     A healthcare provider will help you develop a meal plan that fits your needs.   Ask your healthcare team to help you make a meal plan that fits your needs. Your meal plan tells you when to eat your meals and snacks, what kinds of foods to eat, and how much of each food to eat. You don’t have to give up all the foods you like. But you do need to follow some guidelines.   Choose healthy carbohydrates  Starches, sugars, and fiber are all types of carbohydrates. Fiber can help lower your cholesterol and triglycerides. Fiber is also healthy for your heart. You should have 20 to 35 grams of total fiber each day. Fiber-rich foods include:   · Whole-grain breads and cereals  · Bulgur wheat  · Brown rice · Whole-wheat pasta  · Fruits and vegetables  · Dry beans, and peas   Keep track of the amount of carbohydrates you eat. This can help you keep the right balance of physical activity and medicine. The amount of carbohydrates needed will vary for each person. It depends on many things such as your health, the medicines you take, and how active you are. Your healthcare team will help you figure out the right amount of carbohydrates for you. You may start with around 45 to 60 grams of carbohydrates per meal, depending on your situation.    Here are some examples of foods containing about 15 grams of carbohydrates (1 serving of carbohydrates):   · 1/2 cup of canned or frozen fruit  · A small piece of fresh fruit (4 ounces)  · 1 slice of bread  · 1/2 cup of oatmeal  · 1/3 cup of rice  · 4 to 6 crackers  · 1/2 English muffin  · 1/2 cup of black beans  · 1/4 of a large baked potato (3 ounces)  · 2/3 cup of plain fat-free yogurt  · 1 cup of soup  · 1/2 cup of casserole  · 6 chicken nuggets  · 2-inch-square  brownie or cake without frosting  · 2 small cookies  · 1/2 cup of ice cream or sherbet  Choose healthy protein foods  Eating protein that is low in fat can help you control your weight. It also helps keep your heart healthy. Low-fat protein foods include:   · Fish  · Plant proteins, such as dry beans and peas, nuts, and soy products like tofu and soymilk  · Lean meat with all visible fat removed  · Poultry with the skin removed  · Low-fat or nonfat milk, cheese, and yogurt  Limit unhealthy fats and sugar  Saturated and trans fats are unhealthy for your heart. They raise LDL (bad) cholesterol. Fat is also high in calories, so it can make you gain weight. To cut down on unhealthy fats and sugar, limit these foods:   · Butter or margarine  · Palm and palm kernel oils and coconut oil  · Cream  · Cheese  · To  · Lunch meats · Ice cream  · Sweet bakery goods such as pies, muffins, and donuts  · Jams and jellies  · Candy bars  · Regular sodas   How much to eat  The amount of food you eat affects your blood sugar. It also affects your weight. Your healthcare team will tell you how much of each type of food you should eat.   · Use measuring cups and spoons and a food scale to measure serving sizes.  · Learn what a correct serving size looks like on your plate. This will help when you are away from home and can’t measure your servings. For example, a serving of meat is about the palm of your hand.  · Eat only the number of servings given on your meal plan for each food. Don’t take seconds.  · Learn to read food labels. Be sure to look at serving size, total carbohydrates, fiber, calories, sugar, and saturated and trans fats. Look for healthier alternatives to foods that have added sugar.  · Plan ahead for parties so you can still have a good time without going overboard with unhealthy food choices. Set a good example yourself by bringing a healthy dish to pot lucks.   Choose healthy snacks  When it comes to snacks, we  usually think about foods with added sugar and fats. But there are many other options for healthier snack choices. Here are a few snack ideas to choose from:   Snacks with less than 5 grams of carbohydrates  · 1 piece of string cheese  · 3 celery sticks plus 1 tablespoon of peanut butter  · 5 cherry tomatoes plus 1 tablespoon of ranch dressing  · 1 hard-boiled egg  · 1/4 cup of fresh blueberries  ·  5 baby carrots  · 1 cup of light popcorn  · 1/2 cup of sugar-free gelatin  · 15 almonds  Snacks with about 10 to 20 grams of carbohydrates  · 1/3 cup of hummus plus 1 cup of fresh cut nonstarchy vegetables (carrots, green peppers, broccoli, celery, or a combination)  · 1/2 cup of fresh or canned fruit plus 1/4 cup of cottage cheese  · 1/2 cup of tuna salad with 4 crackers  · 2 rice cakes and a tablespoon of peanut butter  · 1 small apple or orange  · 3 cups light popcorn  · 1/2 of a turkey sandwich (1 slice of whole-wheat bread, 2 ounces of turkey, and mustard)  Portion sizes are important to controlling your blood sugar and staying at a healthy weight. Stock up on healthy snack items so you always have them on hand.   When to eat  Your meal plan will likely include breakfast, lunch, dinner, and some snacks.  · Try to eat your meals and snacks at about the same times each day.  · Eat all your meals and snacks. Skipping a meal or snack can make your blood sugar drop too low. It can also cause you to eat too much at the next meal or snack. Then your blood sugar could get too high.  [employer] last reviewed this educational content on 11/1/2018  © 8785-8315 [employer]. [address] [place] [address], [place] [address]. All rights reserved. This information is not intended as a substitute for professional medical care. Always follow your healthcare professional's instructions.            none

## 2022-08-01 NOTE — ASU PATIENT PROFILE, ADULT - FALL HARM RISK - UNIVERSAL INTERVENTIONS
Bed in lowest position, wheels locked, appropriate side rails in place/Call bell, personal items and telephone in reach/Instruct patient to call for assistance before getting out of bed or chair/Non-slip footwear when patient is out of bed/[place] to call system/Physically safe environment - no spills, clutter or unnecessary equipment/Purposeful Proactive Rounding/Room/bathroom lighting operational, light cord in reach

## 2022-08-02 ENCOUNTER — TRANSCRIPTION ENCOUNTER (OUTPATIENT)
Age: 62
End: 2022-08-02

## 2022-08-02 ENCOUNTER — APPOINTMENT (OUTPATIENT)
Dept: ORTHOPEDIC SURGERY | Facility: AMBULATORY SURGERY CENTER | Age: 62
End: 2022-08-02

## 2022-08-02 ENCOUNTER — OUTPATIENT (OUTPATIENT)
Dept: OUTPATIENT SERVICES | Facility: HOSPITAL | Age: 62
LOS: 1 days | Discharge: HOME | End: 2022-08-02

## 2022-08-02 VITALS
HEIGHT: 55 IN | HEART RATE: 71 BPM | RESPIRATION RATE: 16 BRPM | DIASTOLIC BLOOD PRESSURE: 61 MMHG | TEMPERATURE: 98 F | OXYGEN SATURATION: 97 % | WEIGHT: 144.62 LBS | SYSTOLIC BLOOD PRESSURE: 133 MMHG

## 2022-08-02 VITALS
HEART RATE: 60 BPM | SYSTOLIC BLOOD PRESSURE: 114 MMHG | OXYGEN SATURATION: 95 % | RESPIRATION RATE: 15 BRPM | TEMPERATURE: 98 F | DIASTOLIC BLOOD PRESSURE: 60 MMHG

## 2022-08-02 DIAGNOSIS — Z98.890 OTHER SPECIFIED POSTPROCEDURAL STATES: Chronic | ICD-10-CM

## 2022-08-02 PROCEDURE — 29880 ARTHRS KNE SRG MNISECTMY M&L: CPT | Mod: LT

## 2022-08-02 RX ORDER — SODIUM CHLORIDE 9 MG/ML
1000 INJECTION, SOLUTION INTRAVENOUS
Refills: 0 | Status: DISCONTINUED | OUTPATIENT
Start: 2022-08-02 | End: 2022-08-02

## 2022-08-02 RX ORDER — NIRMATRELVIR AND RITONAVIR 300-100 MG
20 X 150 MG & KIT ORAL
Qty: 30 | Refills: 0 | Status: COMPLETED | COMMUNITY
Start: 2022-07-10

## 2022-08-02 RX ORDER — MONTELUKAST 4 MG/1
1 TABLET, CHEWABLE ORAL
Qty: 0 | Refills: 0 | DISCHARGE

## 2022-08-02 RX ORDER — PANTOPRAZOLE SODIUM 20 MG/1
1 TABLET, DELAYED RELEASE ORAL
Qty: 0 | Refills: 0 | DISCHARGE

## 2022-08-02 RX ORDER — ONDANSETRON 8 MG/1
4 TABLET, FILM COATED ORAL ONCE
Refills: 0 | Status: DISCONTINUED | OUTPATIENT
Start: 2022-08-02 | End: 2022-08-02

## 2022-08-02 RX ORDER — ACETAMINOPHEN 500 MG
1000 TABLET ORAL ONCE
Refills: 0 | Status: COMPLETED | OUTPATIENT
Start: 2022-08-02 | End: 2022-08-02

## 2022-08-02 RX ORDER — BUDESONIDE AND FORMOTEROL FUMARATE DIHYDRATE 160; 4.5 UG/1; UG/1
2 AEROSOL RESPIRATORY (INHALATION)
Qty: 0 | Refills: 0 | DISCHARGE

## 2022-08-02 RX ORDER — OXYCODONE HYDROCHLORIDE 5 MG/1
5 TABLET ORAL ONCE
Refills: 0 | Status: DISCONTINUED | OUTPATIENT
Start: 2022-08-02 | End: 2022-08-02

## 2022-08-02 RX ORDER — ACETAMINOPHEN 500 MG
650 TABLET ORAL EVERY 6 HOURS
Refills: 0 | Status: DISCONTINUED | OUTPATIENT
Start: 2022-08-02 | End: 2022-08-02

## 2022-08-02 RX ORDER — CELECOXIB 200 MG/1
1 CAPSULE ORAL
Qty: 0 | Refills: 0 | DISCHARGE

## 2022-08-02 RX ORDER — DENOSUMAB 60 MG/ML
0 INJECTION SUBCUTANEOUS
Qty: 0 | Refills: 0 | DISCHARGE

## 2022-08-02 RX ADMIN — SODIUM CHLORIDE 75 MILLILITER(S): 9 INJECTION, SOLUTION INTRAVENOUS at 12:29

## 2022-08-02 RX ADMIN — Medication 1000 MILLIGRAM(S): at 09:09

## 2022-08-02 RX ADMIN — CHLORHEXIDINE GLUCONATE 1 APPLICATION(S): 213 SOLUTION TOPICAL at 08:45

## 2022-08-02 RX ADMIN — OXYCODONE HYDROCHLORIDE 5 MILLIGRAM(S): 5 TABLET ORAL at 12:30

## 2022-08-02 RX ADMIN — OXYCODONE HYDROCHLORIDE 5 MILLIGRAM(S): 5 TABLET ORAL at 13:00

## 2022-08-02 NOTE — ASU DISCHARGE PLAN (ADULT/PEDIATRIC) - NS MD DC FALL RISK RISK
For information on Fall & Injury Prevention, visit: [email or web address] OR  [email or web address] OR  https://www.cdc.gov/steadi/patient.html

## 2022-08-02 NOTE — PRE-ANESTHESIA EVALUATION ADULT - NSANTHHOMEMEDSFT_GEN_ALL_CORE
Celecoxib CAPS  Famotidine 40 MG Oral Tablet  Monovisc 88 MG/4ML Intra-articular Solution Prefilled Syringe; USE AS DIRECTED  Montelukast Sodium TABS  ProAir HFA AERS  Symbicort AERO  Vitamin B12 TABS  Vitamin D TABS

## 2022-08-02 NOTE — BRIEF OPERATIVE NOTE - NSICDXBRIEFPREOP_GEN_ALL_CORE_FT
PRE-OP DIAGNOSIS:  Tear of meniscus, medial 02-Aug-2022 09:48:27  Maria C Lanza  Current tear of lateral cartilage or meniscus of knee, left, initial encounter 02-Aug-2022 09:49:32  Maria C Lanza

## 2022-08-02 NOTE — ASU DISCHARGE PLAN (ADULT/PEDIATRIC) - CARE PROVIDER_API CALL
Maria C Lanza)  Orthopaedic Sports Medicine; Orthopaedic Surgery  [address], [address] [place] [address]  Phone: [phone]  Fax: [phone]  Follow Up Time:

## 2022-08-02 NOTE — PRE-ANESTHESIA EVALUATION ADULT - NSANTHPMHFT_GEN_ALL_CORE
IgG kappa monoclonal gammopathy of undetermined significance- stable per heme note, clearance in sunrise MGUS: IgG kappa monoclonal gammopathy of undetermined significance- stable per heme note, clearance in sunrise. plts are now 146 (low plts sp recent infection 2/2 COVID ...COVID positive 7/10- no longer with sx or fever)  Osteopenia- fb endo    NF 1- sp neurofibroma removal RUE- pt states no limb alert to that side, only manifestation is cafe au lait spots    PSxH: varicose vein, neurofibroma cyst removed RUE, hand, hemorrhoid removal- no complications

## 2022-08-02 NOTE — BRIEF OPERATIVE NOTE - NSICDXBRIEFPOSTOP_GEN_ALL_CORE_FT
POST-OP DIAGNOSIS:  Tear of meniscus, medial 02-Aug-2022 10:50:33  Abdelrahman Zamudio  Current tear of lateral cartilage or meniscus of knee 02-Aug-2022 10:52:22  Abdelrahman Zamudio

## 2022-08-02 NOTE — BRIEF OPERATIVE NOTE - COMMENTS
Left WBAT with the aid of a cane. F/u with Dr. Merino in 2 weeks for post op visit. Left WBAT with the aid of a cane. F/u with Dr. Merino in 2 weeks for post op visit. Keep dressings intact, clean and dry for 48 hours. Once dressing removed, please cover sutures with bandaid.

## 2022-08-02 NOTE — ASU DISCHARGE PLAN (ADULT/PEDIATRIC) - ASU DC SPECIAL INSTRUCTIONSFT
cane WBAT Left WBAT with the aid of a cane. F/u with Dr. Merino in 2 weeks for post op visit. Keep dressings intact, clean and dry for 48 hours. Once dressing removed, please cover sutures with bandaid.

## 2022-08-10 PROBLEM — M81.0 AGE-RELATED OSTEOPOROSIS WITHOUT CURRENT PATHOLOGICAL FRACTURE: Chronic | Status: ACTIVE | Noted: 2022-08-02

## 2022-08-10 PROBLEM — Z87.898 PERSONAL HISTORY OF OTHER SPECIFIED CONDITIONS: Chronic | Status: ACTIVE | Noted: 2022-08-02

## 2022-08-10 PROBLEM — J45.909 UNSPECIFIED ASTHMA, UNCOMPLICATED: Chronic | Status: ACTIVE | Noted: 2022-08-02

## 2022-08-15 ENCOUNTER — APPOINTMENT (OUTPATIENT)
Dept: ORTHOPEDIC SURGERY | Facility: CLINIC | Age: 62
End: 2022-08-15

## 2022-08-15 DIAGNOSIS — S93.492A SPRAIN OF OTHER LIGAMENT OF LEFT ANKLE, INITIAL ENCOUNTER: ICD-10-CM

## 2022-08-15 PROCEDURE — 73610 X-RAY EXAM OF ANKLE: CPT | Mod: LT

## 2022-08-15 PROCEDURE — 99024 POSTOP FOLLOW-UP VISIT: CPT

## 2022-08-15 NOTE — HISTORY OF PRESENT ILLNESS
[___ Days Post Op] : post op day #[unfilled] [Sutures Removed] : sutures were removed [Procedure: ___] : status post [unfilled] [Healed] : healed [Neuro Intact] : an unremarkable neurological exam [Vascular Intact] : ~T peripheral vascular exam normal [Fever] : no fever [Erythema] : not erythematous [Discharge] : absent of discharge [Swelling] : not swollen [Dehiscence] : not dehisced [de-identified] : LEFT knee arthroscopy partial medial and lateral meniscectomy and chondroplasty [de-identified] : Ms. Medrano states that her knee is getting progressively better.  Pain is moderate.  She is taking.\par She still having pain that radiates down her anterolateral leg down to the ankle sometimes with pain in the ankle itself.  This had started last year when she fell.  She is doing some of her exercises. [de-identified] : X-rays of the left ankle weightbearing AP, lateral and mortise views today showed intact mortise.  No visible fractures or any significant arthritis appreciated [de-identified] : Left knee and lower leg and ankle\par Mildly antalgic gait walking on a flexed left knee using a cane.\par Incisions healing nicely.\par Range of motion is with about a 10 degree flexion contracture and flexion to 115 degrees.\par Tender lateral joint line.\par Varicose veins left lower leg.\par Left ankle [de-identified] : Status post left knee arthroscopy almost 2 weeks ago.  She is coming along as expected.  You will take time for her to recover and to see what effect the surgery has to alleviate her pain given the meniscus tears and the arthritis.  We did an x-ray of her ankle because of ongoing ankle pain.  She will do therapy for the ankle since nothing was seen on x-ray.  If it continues to hurt we will get an MRI.  I think likely a lot of the lateral leg and ankle pain and is being referred from the lateral knee arthritis.  She we will rehabilitate her knee and work on the motion.  There are areas of grade 4 changes in the knee/arthritis.  The goal was to try to get her some pain relief and improve motion.  We will see how she does with the rehab now over the next 3 to 6 months.  We can try to manage the arthritis pain.  Ultimately if she has ongoing issues and progressive arthritis then knee replacement may be considered. [de-identified] : Physical therapy was prescribed.  She should work on range of motion.\par Tylenol or NSAIDs as needed.  Continue with cane until she can walk well without it.\par Follow-up in about a month

## 2022-08-16 ENCOUNTER — APPOINTMENT (OUTPATIENT)
Dept: HEMATOLOGY ONCOLOGY | Facility: CLINIC | Age: 62
End: 2022-08-16

## 2022-08-16 VITALS
HEIGHT: 59 IN | HEART RATE: 85 BPM | SYSTOLIC BLOOD PRESSURE: 122 MMHG | WEIGHT: 146 LBS | TEMPERATURE: 96.9 F | DIASTOLIC BLOOD PRESSURE: 80 MMHG | BODY MASS INDEX: 29.43 KG/M2 | OXYGEN SATURATION: 96 %

## 2022-08-16 PROCEDURE — 36415 COLL VENOUS BLD VENIPUNCTURE: CPT

## 2022-08-16 PROCEDURE — 99214 OFFICE O/P EST MOD 30 MIN: CPT | Mod: 25

## 2022-08-16 NOTE — HISTORY OF PRESENT ILLNESS
[de-identified] :  Patient is a 62 year old female with a history of neurofibromatosis type 1, osteoporosis, osteoarthritis, who presents for a hematologic follow up of IgG monoclonal gammopathy of undetermined significance and thrombocytopenia.. Most recent blood results from July 27 revealed a CBC significant for a white count of 8.31, hemoglobin of 14.7, hematocrit of 46.1 and an improving platelet count at 146,000. The prior platelet count of 114,000 was likely due to patient's recent COVID-19 infection. Patient also has a stable IgG kappa monoclonal gammopathy of undetermined significance with an M spike of 0.2 and a normal kappa to lambda ratio at the initial visit.  Urine at that time was absent for Bence-Rouse protein and no monoclonal band was identified on urine immunofixation.. Since last visit, patient successfully  underwent arthroscopic left knee procedure without excessive bleeding. Patient complains of fatigue and pain attributed to recovering from recent surgery. Otherwise, patient feels generally well at this time. Denies fever, chills, chest pain, palpitations, abdominal pain, or recent infections.

## 2022-08-16 NOTE — REVIEW OF SYSTEMS
[Fatigue] : fatigue [Joint Pain] : joint pain [Joint Stiffness] : joint stiffness [Negative] : Allergic/Immunologic [Fever] : no fever [Chills] : no chills [Night Sweats] : no night sweats [Recent Change In Weight] : ~T no recent weight change [Chest Pain] : no chest pain [Palpitations] : no palpitations [Shortness Of Breath] : no shortness of breath [Abdominal Pain] : no abdominal pain [Easy Bleeding] : no tendency for easy bleeding [Easy Bruising] : no tendency for easy bruising [FreeTextEntry9] : Left knee s/p arthroscopic surgery

## 2022-08-16 NOTE — REASON FOR VISIT
[Follow-Up Visit] : a follow-up visit for [FreeTextEntry2] : IgG Monoclonal gammopathy of undetermined significance (MGUS)

## 2022-08-16 NOTE — ASSESSMENT
[FreeTextEntry1] :  Patient is a 62 year old female with a history of neurofibromatosis type 1, osteoporosis, osteoarthritis, who presents for a hematologic follow up of IgG monoclonal gammopathy of undetermined significance and thrombocytopenia.  She successfully underwent left knee arthroscopic surgery and is presently recovering.Have ordered CBC, sedimentation rate, manual differential, comprehensive metabolic panel, LDH, uric acid, serum protein electrophoresis, immunoelectrophoresis, immunofixation, CRP, beta-2 microglobulin, and serum free light chains . Venipuncture  was performed in the office today. Have advised patient to call the office to discuss results, further recommendations and next appointment date. \par \par  \par

## 2022-08-16 NOTE — PHYSICAL EXAM
[Normal] : affect appropriate [de-identified] : Bilateral lower extremity varicosities [de-identified] : slightly limited motion of L knee, slight swelling distal LLE [de-identified] : Multiple cafe au lait spots on trunk and extremities

## 2022-08-17 LAB
ALBUMIN SERPL ELPH-MCNC: 4.7 G/DL
ALP BLD-CCNC: 85 U/L
ALT SERPL-CCNC: 20 U/L
ANION GAP SERPL CALC-SCNC: 15 MMOL/L
AST SERPL-CCNC: 15 U/L
B2 MICROGLOB SERPL-MCNC: 1.5 MG/L
BASOPHILS # BLD AUTO: 0 K/UL
BASOPHILS # BLD AUTO: 0 K/UL
BASOPHILS NFR BLD AUTO: 0 %
BASOPHILS NFR BLD AUTO: 0 %
BILIRUB SERPL-MCNC: 0.3 MG/DL
BUN SERPL-MCNC: 20 MG/DL
CALCIUM SERPL-MCNC: 9.7 MG/DL
CHLORIDE SERPL-SCNC: 103 MMOL/L
CO2 SERPL-SCNC: 24 MMOL/L
CREAT SERPL-MCNC: 0.46 MG/DL
CRP SERPL-MCNC: <3 MG/L
DEPRECATED KAPPA LC FREE/LAMBDA SER: 1.67 RATIO
EGFR: 109 ML/MIN/1.73M2
EOSINOPHIL # BLD AUTO: 0 K/UL
EOSINOPHIL # BLD AUTO: 0 K/UL
EOSINOPHIL NFR BLD AUTO: 0 %
EOSINOPHIL NFR BLD AUTO: 0 %
ERYTHROCYTE [SEDIMENTATION RATE] IN BLOOD BY WESTERGREN METHOD: 15 MM/HR
GLUCOSE SERPL-MCNC: 80 MG/DL
HCT VFR BLD CALC: 47.9 %
HGB BLD-MCNC: 15.2 G/DL
KAPPA LC CSF-MCNC: 0.89 MG/DL
KAPPA LC SERPL-MCNC: 1.49 MG/DL
LDH SERPL-CCNC: 212 U/L
LYMPHOCYTES # BLD AUTO: 1.15 K/UL
LYMPHOCYTES # BLD AUTO: 1.15 K/UL
LYMPHOCYTES NFR BLD AUTO: 13 %
LYMPHOCYTES NFR BLD AUTO: 13 %
MAN DIFF?: NORMAL
MCHC RBC-ENTMCNC: 27.2 PG
MCHC RBC-ENTMCNC: 31.7 GM/DL
MCV RBC AUTO: 85.7 FL
MONOCYTES # BLD AUTO: 0.39 K/UL
MONOCYTES # BLD AUTO: 0.39 K/UL
MONOCYTES NFR BLD AUTO: 4.4 %
MONOCYTES NFR BLD AUTO: 4.4 %
MSMEAR: NORMAL
NEUTROPHILS # BLD AUTO: 7.33 K/UL
NEUTROPHILS # BLD AUTO: 7.33 K/UL
NEUTROPHILS NFR BLD AUTO: 82.6 %
NEUTROPHILS NFR BLD AUTO: 82.6 %
PLAT MORPH BLD: ABNORMAL
PLATELET # BLD AUTO: 162 K/UL
POTASSIUM SERPL-SCNC: 4.3 MMOL/L
PROT SERPL-MCNC: 7.4 G/DL
RBC # BLD: 5.59 M/UL
RBC # FLD: 13.2 %
RBC BLD AUTO: NORMAL
SODIUM SERPL-SCNC: 142 MMOL/L
URATE SERPL-MCNC: 2.8 MG/DL
WBC # FLD AUTO: 8.88 K/UL

## 2022-08-22 LAB — VISCOSITY, SERUM: 1.7

## 2022-08-23 ENCOUNTER — NON-APPOINTMENT (OUTPATIENT)
Age: 62
End: 2022-08-23

## 2022-08-23 LAB
ALBUMIN MFR SERPL ELPH: 58.5 %
ALBUMIN SERPL-MCNC: 4.3 G/DL
ALBUMIN/GLOB SERPL: 1.4 RATIO
ALPHA1 GLOB MFR SERPL ELPH: 4.2 %
ALPHA1 GLOB SERPL ELPH-MCNC: 0.3 G/DL
ALPHA2 GLOB MFR SERPL ELPH: 12.1 %
ALPHA2 GLOB SERPL ELPH-MCNC: 0.9 G/DL
B-GLOBULIN MFR SERPL ELPH: 12.5 %
B-GLOBULIN SERPL ELPH-MCNC: 0.9 G/DL
DEPRECATED KAPPA LC FREE/LAMBDA SER: 1.67 RATIO
GAMMA GLOB FLD ELPH-MCNC: 0.9 G/DL
GAMMA GLOB MFR SERPL ELPH: 12.7 %
IGA SER QL IEP: 319 MG/DL
IGG SER QL IEP: 917 MG/DL
IGM SER QL IEP: 112 MG/DL
INTERPRETATION SERPL IEP-IMP: NORMAL
KAPPA LC CSF-MCNC: 0.89 MG/DL
KAPPA LC SERPL-MCNC: 1.49 MG/DL
M PROTEIN MFR SERPL ELPH: 2.4 %
M PROTEIN SPEC IFE-MCNC: NORMAL
MONOCLON BAND OBS SERPL: 0.2 G/DL
PROT SERPL-MCNC: 7.4 G/DL
PROT SERPL-MCNC: 7.4 G/DL

## 2022-09-16 ENCOUNTER — APPOINTMENT (OUTPATIENT)
Dept: ORTHOPEDIC SURGERY | Facility: CLINIC | Age: 62
End: 2022-09-16

## 2022-09-16 DIAGNOSIS — I83.90 ASYMPTOMATIC VARICOSE VEINS OF UNSPECIFIED LOWER EXTREMITY: ICD-10-CM

## 2022-09-16 DIAGNOSIS — M23.204 DERANGEMENT OF UNSPECIFIED MEDIAL MENISCUS DUE TO OLD TEAR OR INJURY, LEFT KNEE: ICD-10-CM

## 2022-09-16 PROCEDURE — 99024 POSTOP FOLLOW-UP VISIT: CPT

## 2022-09-16 NOTE — HISTORY OF PRESENT ILLNESS
[Procedure: ___] : status post [unfilled] [___ Weeks Post Op] : [unfilled] weeks post op [Healed] : healed [Neuro Intact] : an unremarkable neurological exam [Vascular Intact] : ~T peripheral vascular exam normal [Fever] : no fever [Erythema] : not erythematous [Discharge] : absent of discharge [Swelling] : not swollen [Dehiscence] : not dehisced [de-identified] : LEFT knee arthroscopy partial medial and lateral meniscectomy and chondroplasty [de-identified] : Ms. Rubinstein states that her knee is getting a bit better but she still has stiffness and pain more down the lateral lower leg than in the knee although there is some lateral knee pain.  She still cannot get it fully straight.  She had done more therapy but then was cut off from therapy.  I talked her about the varicose veins.  She does have compression stockings but has not been wearing them.  She did have varicose vein surgery years ago. [de-identified] : Left knee and lower leg and ankle\par .\par Incisions healed nicely.\par Range of motion is with about a 10 degree flexion contracture and flexion to 115 degrees.\par Tender lateral joint line.\par Varicose veins left lower leg. [de-identified] : X-rays of the left ankle weightbearing AP, lateral and mortise views last visit and old x-rays of the tibia and fibula show phleboliths consistent with varicose veins but no fractures or other abnormalities to explain the lower leg pain.  Prior knee x-ray showed severe patellofemoral and mild to moderate lateral compartment arthrosis [de-identified] : Status post left knee arthroscopy about 7weeks ago.  Her knee is a little better but not as much as we would like.  She still cannot get back her extension.  I think ultimately she will need a knee replacement but she will continue working on the exercises, range of motion and strengthening on her own and hopefully will see some improvement to get her by for a while.  We can consider the injections again.\par In terms of the lower leg pain I recommended compression stockings and wrote a prescription for thigh-high stockings and she has some knee-high stockings at home that she could use.  Elevate and ice and warm soaks intermittently.\par If she has ongoing leg pain I suggested seeing a vascular specialist to see if they feel that is the cause.  Also in the differential would be a lumbar radicular pain but that does not seem likely today. [de-identified] : Follow-up in 6 weeks

## 2022-10-28 ENCOUNTER — APPOINTMENT (OUTPATIENT)
Dept: ORTHOPEDIC SURGERY | Facility: CLINIC | Age: 62
End: 2022-10-28

## 2022-10-28 DIAGNOSIS — S83.272D COMPLEX TEAR OF LATERAL MENISCUS, CURRENT INJURY, LEFT KNEE, SUBSEQUENT ENCOUNTER: ICD-10-CM

## 2022-10-28 PROCEDURE — 99024 POSTOP FOLLOW-UP VISIT: CPT

## 2022-10-28 NOTE — HISTORY OF PRESENT ILLNESS
[Procedure: ___] : status post [unfilled] [___ Weeks Post Op] : [unfilled] weeks post op [Healed] : healed [Neuro Intact] : an unremarkable neurological exam [Vascular Intact] : ~T peripheral vascular exam normal [Fever] : no fever [Erythema] : not erythematous [Discharge] : absent of discharge [Swelling] : not swollen [Dehiscence] : not dehisced [Slow Progress] : is progressing slowly [No Sign of Infection] : is showing no signs of infection [Adequate Pain Control] : has adequate pain control [de-identified] : LEFT knee arthroscopy partial medial and lateral meniscectomy and chondroplasty [de-identified] : Ms. Rubinstein states that her knee is partially better but she still has some pain and stiffness.  She only had a few sessions of physical therapy after surgery because apparently her insurance cut her off.  She did do a lot of therapy before hand and knows the exercises.  She does partially but feels that she could be doing more.  She has some ongoing discomfort and stiffness in the knee [de-identified] : Left knee and lower leg and ankle\par Mildly antalgic gait still walking on a somewhat flexed left knee and mild valgus\par Incisions healed nicely.\par Range of motion is with about a 10 degree flexion contracture and flexion to 115 degrees.\par Tender lateral joint line.\par Varicose veins left lower leg. [de-identified] : Status post left knee arthroscopy about 12 weeks ago.  She still has flexion contracture.  I think the osteoarthritis which is severe in the patellofemoral joint and progressing in the lateral compartment is her main issues.  Debriding the meniscus tear so far has not made a significant difference although her pain is not that bad.  I like to see her knee getting a little straighter if possible.  It would make her gait and function much better.  I referred her for a knee brace to work on knee extension and to unload the lateral compartment/guardian knee brace.  I think this would help her rehabilitate her knee better.  We reviewed exercises that she should be doing on her own working on motion and strengthening. [de-identified] : Follow-up in 4-6 weeks

## 2022-11-07 ENCOUNTER — APPOINTMENT (OUTPATIENT)
Dept: HEMATOLOGY ONCOLOGY | Facility: CLINIC | Age: 62
End: 2022-11-07

## 2022-11-07 ENCOUNTER — LABORATORY RESULT (OUTPATIENT)
Age: 62
End: 2022-11-07

## 2022-11-07 VITALS
BODY MASS INDEX: 30.64 KG/M2 | SYSTOLIC BLOOD PRESSURE: 128 MMHG | HEART RATE: 87 BPM | WEIGHT: 152 LBS | OXYGEN SATURATION: 96 % | TEMPERATURE: 96.7 F | HEIGHT: 59 IN | DIASTOLIC BLOOD PRESSURE: 80 MMHG

## 2022-11-07 LAB — ERYTHROCYTE [SEDIMENTATION RATE] IN BLOOD BY WESTERGREN METHOD: 20 MM/HR

## 2022-11-07 PROCEDURE — 99214 OFFICE O/P EST MOD 30 MIN: CPT | Mod: 25

## 2022-11-07 PROCEDURE — 36415 COLL VENOUS BLD VENIPUNCTURE: CPT

## 2022-11-07 NOTE — ASSESSMENT
[FreeTextEntry1] : Patient is a 62 year old female with a history of neurofibromatosis type 1, osteoporosis, osteoarthritis, who presents for a hematologic follow up of IgG monoclonal gammopathy of undetermined significance (MGUS) and thrombocytopenia. She successfully underwent left knee arthroscopic surgery in August. Will continue monitoring her blood results.  She has had a slightly elevated hematocrit on 2 occasions and will evaluate this further.  Have ordered CBC with differential, CMP, CRP, Immunoelectrophoresis, Immunofixation, Immunoglobulin Free Light Chains,  Immunoglobulins Panel, JAK2 V617F Mutation, LDH, manual differential, protein electrophoresis, sed rate, uric acid and viscosity. Venipuncture was performed in the office today. Have advised patient to call the office to discuss results, further recommendations and next appointment date. \par

## 2022-11-07 NOTE — PHYSICAL EXAM
[Normal] : affect appropriate [de-identified] : Bilateral lower extremity varicosities [de-identified] : slightly limited motion of L knee, slight swelling distal LLE [de-identified] : Multiple cafe au lait spots on trunk and extremities

## 2022-11-07 NOTE — REVIEW OF SYSTEMS
[Recent Change In Weight] : ~T recent weight change [Joint Pain] : joint pain [Joint Stiffness] : joint stiffness [Negative] : Allergic/Immunologic [Chills] : no chills [Night Sweats] : no night sweats [Fatigue] : no fatigue [Chest Pain] : no chest pain [Shortness Of Breath] : no shortness of breath [Abdominal Pain] : no abdominal pain [Easy Bleeding] : no tendency for easy bleeding [Easy Bruising] : no tendency for easy bruising [FreeTextEntry2] : Gained 12 pounds [FreeTextEntry9] : Left knee pain and stiffness

## 2022-11-07 NOTE — REASON FOR VISIT
[Follow-Up Visit] : a follow-up visit for [FreeTextEntry2] : IgG monoclonal gammopathy of undetermined significance, thrombocytopenia, elevated hematocrit

## 2022-11-07 NOTE — HISTORY OF PRESENT ILLNESS
[de-identified] : Patient is a 62 year old female with a history of neurofibromatosis type 1, osteoporosis, osteoarthritis, who presents for a hematologic follow up of IgG monoclonal gammopathy of undetermined significance (MGUS) and thrombocytopenia. Blood results from the last visit revealed a slightly elevated hematocrit at 47.9, hemoglobin 15.2 and a normal white blood count and platelet count. Neutrophils were slightly increased in the differential probably due to patient's recent orthopedic knee procedure. Immunoelectrophoresis revealed a very stable M spike at 0.2, with an IgG G kappa band identified. The amount of IgG was normal at 917 with a normal amount of IgA and IgM. The kappa to lambda ratio was 1.67. Sed rate was normal and all other parameters were normal or stable. Patient successfully underwent arthroscopic left knee procedure without excessive bleeding in August. Today, the patient complains of arthritic pain in the left knee attributed to recovering from her recent surgery, and limited range of motion especially when walking which she feels is slowly improving. Otherwise, patient feels generally well at this time. Patient gained 12 pounds unintentionally during the last 3 months while she was unemployed, but started a new job a week ago and now stays active by doing home exercises. Denies fever, chills, chest pain, palpitations, abdominal pain, or recent infections. Of note, the patient received the flu vaccine last week.  \par

## 2022-11-08 LAB
ALBUMIN SERPL ELPH-MCNC: 4.3 G/DL
ALP BLD-CCNC: 66 U/L
ALT SERPL-CCNC: 13 U/L
ANION GAP SERPL CALC-SCNC: 14 MMOL/L
AST SERPL-CCNC: 13 U/L
B2 MICROGLOB SERPL-MCNC: 1.5 MG/L
BASOPHILS # BLD AUTO: 0 K/UL
BASOPHILS # BLD AUTO: 0 K/UL
BASOPHILS NFR BLD AUTO: 0 %
BASOPHILS NFR BLD AUTO: 0 %
BILIRUB SERPL-MCNC: 0.3 MG/DL
BUN SERPL-MCNC: 18 MG/DL
CALCIUM SERPL-MCNC: 9.4 MG/DL
CHLORIDE SERPL-SCNC: 105 MMOL/L
CO2 SERPL-SCNC: 22 MMOL/L
CREAT SERPL-MCNC: 0.48 MG/DL
CRP SERPL-MCNC: 4 MG/L
DEPRECATED KAPPA LC FREE/LAMBDA SER: 1.5 RATIO
EGFR: 108 ML/MIN/1.73M2
EOSINOPHIL # BLD AUTO: 0.07 K/UL
EOSINOPHIL # BLD AUTO: 0.07 K/UL
EOSINOPHIL NFR BLD AUTO: 0.9 %
EOSINOPHIL NFR BLD AUTO: 0.9 %
GLUCOSE SERPL-MCNC: 98 MG/DL
HCT VFR BLD CALC: 44.7 %
HGB BLD-MCNC: 14.3 G/DL
KAPPA LC CSF-MCNC: 1 MG/DL
KAPPA LC SERPL-MCNC: 1.5 MG/DL
LDH SERPL-CCNC: 194 U/L
LYMPHOCYTES # BLD AUTO: 1.14 K/UL
LYMPHOCYTES # BLD AUTO: 1.14 K/UL
LYMPHOCYTES NFR BLD AUTO: 14.8 %
LYMPHOCYTES NFR BLD AUTO: 14.8 %
MAN DIFF?: NORMAL
MCHC RBC-ENTMCNC: 27.5 PG
MCHC RBC-ENTMCNC: 32 GM/DL
MCV RBC AUTO: 86 FL
MONOCYTES # BLD AUTO: 0.4 K/UL
MONOCYTES # BLD AUTO: 0.4 K/UL
MONOCYTES NFR BLD AUTO: 5.2 %
MONOCYTES NFR BLD AUTO: 5.2 %
MSMEAR: NORMAL
NEUTROPHILS # BLD AUTO: 6.11 K/UL
NEUTROPHILS # BLD AUTO: 6.11 K/UL
NEUTROPHILS NFR BLD AUTO: 79.1 %
NEUTROPHILS NFR BLD AUTO: 79.1 %
PLAT MORPH BLD: NORMAL
PLATELET # BLD AUTO: 166 K/UL
POTASSIUM SERPL-SCNC: 4.3 MMOL/L
PROT SERPL-MCNC: 7 G/DL
RBC # BLD: 5.2 M/UL
RBC # FLD: 13.1 %
RBC BLD AUTO: NORMAL
SODIUM SERPL-SCNC: 141 MMOL/L
URATE SERPL-MCNC: 3.1 MG/DL
WBC # FLD AUTO: 7.73 K/UL

## 2022-11-15 LAB
ALBUMIN MFR SERPL ELPH: 57.5 %
ALBUMIN SERPL-MCNC: 4 G/DL
ALBUMIN/GLOB SERPL: 1.3 RATIO
ALPHA1 GLOB MFR SERPL ELPH: 4.3 %
ALPHA1 GLOB SERPL ELPH-MCNC: 0.3 G/DL
ALPHA2 GLOB MFR SERPL ELPH: 12.3 %
ALPHA2 GLOB SERPL ELPH-MCNC: 0.9 G/DL
B-GLOBULIN MFR SERPL ELPH: 13.2 %
B-GLOBULIN SERPL ELPH-MCNC: 0.9 G/DL
DEPRECATED KAPPA LC FREE/LAMBDA SER: 1.5 RATIO
GAMMA GLOB FLD ELPH-MCNC: 0.9 G/DL
GAMMA GLOB MFR SERPL ELPH: 12.7 %
IGA SER QL IEP: 286 MG/DL
IGG SER QL IEP: 858 MG/DL
IGM SER QL IEP: 104 MG/DL
INTERPRETATION SERPL IEP-IMP: NORMAL
KAPPA LC CSF-MCNC: 1 MG/DL
KAPPA LC SERPL-MCNC: 1.5 MG/DL
M PROTEIN MFR SERPL ELPH: 2.4 %
M PROTEIN SPEC IFE-MCNC: NORMAL
MONOCLON BAND OBS SERPL: 0.2 G/DL
PROT SERPL-MCNC: 7 G/DL
PROT SERPL-MCNC: 7 G/DL
VISCOSITY, SERUM: 1.6

## 2022-11-18 LAB
EXTRACTION: NORMAL
JAK2 P.V617F BLD/T QL: NORMAL
LAB DIRECTOR NAME PROVIDER: NORMAL
LABORATORY COMMENT REPORT: NORMAL
REFLEX:: NORMAL

## 2022-11-21 ENCOUNTER — APPOINTMENT (OUTPATIENT)
Dept: ORTHOPEDIC SURGERY | Facility: CLINIC | Age: 62
End: 2022-11-21

## 2022-11-21 PROCEDURE — 99213 OFFICE O/P EST LOW 20 MIN: CPT

## 2022-11-21 NOTE — PHYSICAL EXAM
[LE] : Sensory: Intact in bilateral lower extremities [Normal RLE] : Right Lower Extremity: No scars, rashes, lesions, ulcers, skin intact [Normal LLE] : Left Lower Extremity: No scars, rashes, lesions, ulcers, skin intact [Normal Touch] : sensation intact for touch [Normal] : Alert and in no acute distress [Slightly Antalgic] : slightly antalgic [de-identified] : Left knee\par Mildly antalgic gait walking on a flexed left knee and valgus malalignment.  Flexion contracture left knee\par Left knee range of motion is with about a 15-20 degree flexion contracture and flexion to 100 degrees with discomfort at extremes of motion\par Right knee range of motion is from almost full extension to 125 to 130 degrees flexion without significant pain\par Tender mildly medial and lateral joint lines left knee.\par Varicose veins left lower leg.  There is several red splotchy skin lesions in the lower leg not raised.  I recommended she see the dermatologist for follow-up on these.\par There is no severe focal tenderness in the left lower leg.\par No pitting edema but there may be mild edema through the lower leg.  No lower leg deformity or crepitus.  Normal varus and valgus laxity.  Ia Lachman.\par + pain with Reese left knee.\par Motor and sensation are intact distally [de-identified] : Varicose veins [de-identified] : Overweight [de-identified] : \par X-rays left knee today compared to bilateral knees weightbearing 4 views AP, lateral, merchant and Avila views May 26, 2022  showed severe bone-on-bone arthritis in the right knee patellofemoral joint and moderate to severe in the left knee and medial and lateral joint productive changes greater right than left.  Left knee medial and lateral joint spaces are relatively well-maintained.  No fractures or acute changes seen\par \par MRI of the left knee performed December 23, 2021 showed severe lateral patellofemoral arthritis, severe mucoid degeneration ACL, small complex tear posterior horn medial meniscus, complex tear posterior horn and body of the lateral meniscus, 10 mm area of severe chondral loss posterior weightbearing lateral femoral condyle, moderate popliteal cyst and small joint effusion.

## 2022-11-21 NOTE — HISTORY OF PRESENT ILLNESS
[de-identified] : 62 year old woman is 3.5 months post op status post LEFT knee arthroscopy, partial medial and lateral meniscectomy and chondroplasty.\par She has ongoing stiffness in the knee which has not improved.  She has been doing some exercises on her own but still cannot get the knee straight at all.  She takes Celebrex 1 tablet/day.  She is back working and on her feet a lot.  She did get the arthritis knee brace last week which she started wearing.  It can tend to slip down a bit.  She is still getting used to it.\par Her pain is about 3-4 out of 10

## 2022-11-21 NOTE — ASSESSMENT
[FreeTextEntry1] : 63 yo woman fell 1 year ago with pain left knee and loss of motion.  She has severe patellofemoral arthritis and more mild to moderate medial and lateral compartment.  She has not really gotten back any of her extension.  The arthroscopy and debridement did not help significantly.  Usually with primarily patellofemoral arthritis patients do not lose as much motion although there was significant cartilage wear in the lateral compartment.\par I think eventually she will need a knee replacement.  I suggested seeing one of my colleagues just to talk about possible knee replacement in the next several months.  In the meantime she will try to work on motion and exercises and use heat and ice and wear the brace and then we will see how she does. She should alternate Tylenol and the Celebrex.  Follow-up in about 6 to 8 weeks.

## 2022-11-28 ENCOUNTER — NON-APPOINTMENT (OUTPATIENT)
Age: 62
End: 2022-11-28

## 2023-01-04 ENCOUNTER — NON-APPOINTMENT (OUTPATIENT)
Age: 63
End: 2023-01-04

## 2023-01-10 ENCOUNTER — NON-APPOINTMENT (OUTPATIENT)
Age: 63
End: 2023-01-10

## 2023-01-10 ENCOUNTER — APPOINTMENT (OUTPATIENT)
Dept: ENDOCRINOLOGY | Facility: CLINIC | Age: 63
End: 2023-01-10

## 2023-01-11 NOTE — PHYSICAL EXAM
[Alert] : alert [Healthy Appearance] : healthy appearance [No Acute Distress] : no acute distress [Normal Sclera/Conjunctiva] : normal sclera/conjunctiva [Normal Hearing] : hearing was normal [No Respiratory Distress] : no respiratory distress [No Spinal Tenderness] : no spinal tenderness [No Stigmata of Cushings Syndrome] : no stigmata of Cushings Syndrome [Normal Gait] : normal gait [Normal Insight/Judgement] : insight and judgment were intact [Kyphosis] : no kyphosis present [Scoliosis] : no scoliosis [Acanthosis Nigricans] : no acanthosis nigricans [de-identified] : no moon facies, no supraclavicular fat pads

## 2023-01-11 NOTE — ADDENDUM
[FreeTextEntry1] : Procedure Note: Denosumab 60 mg SC administered into left deltoid area. Zara Shelley MD\par \par Ms. Rubinstein had a recent dental extraction and may need a dental implant. She has rescheduled her upcoming appointment for the end of January. I advised there are no endocrine contraindications for a dental procedure. 1/04/23\par \par I again reviewed the risk of osteonecrosis of the jaw with denosumab with Ms. Rubinstein. She is undergoing further evaluation for sleep apnea or tooth grinding. 1/11/23\par \par

## 2023-01-11 NOTE — ASSESSMENT
[FreeTextEntry1] : Osteoporosis. She has no history of fragility fracture. She has no history of prior osteoporosis therapy. Metabolic evaluation for secondary causes of bone loss remarkable for monoclonal gammopathy of undetermined significance. We have discussed the potential benefits and risks of the osteoanabolic and antiresorptive classes of pharmacologic osteoporosis therapy, with a focus on the antiresorptive class per her preference. We discussed the potential benefits and risks of antiresorptive osteoporosis therapy, including but not limited to osteonecrosis of the jaw and atypical femoral fracture. She is amenable to therapy with denosumab.\par Start denosumab 60 mg SC every 6 months; dosed today\par Calcium 1200 mg daily from diet and supplements (to be taken in divided doses as no more than 500-600 mg can be absorbed at one time); advised increased dietary and/or supplemental calcium\par Continue current vitamin D regimen\par Diet, exercise and fall prevention discussed\par \par History of neurofibromatosis type 1. She has no symptoms of pheochromocytoma and screening is not necessary at this time. \par \par I reviewed the DXA performed on December 15, 2021 with the patient today.\par I reviewed the laboratories performed on Bea 10, 2022 with the patient today. \par I counseled the patient regarding calcium and vitamin D intake today.\par I discussed the following osteoporosis therapies: Denosumab\par \par CC:\par Dr. Lake Mcgee, Fax [phone]

## 2023-01-11 NOTE — DATA REVIEWED
[FreeTextEntry1] : Laboratories (November 19, 2021) reviewed and significant for: \par Unremarkable complete blood count\par Calcium 9.3 mg/dL (albumin 4.3 g/dL)\par BUN/creatinine 16/0.58 mg/dL (eGFR 106 mL/min)\par Alkaline phosphatase 86 U/L\par 25-hydroxyvitamin D 51.7 ng/dL\par \par Most recent bone mineral density\par Date: December 15, 2021\par Source: [facility]\par Site: [facility] - under Nancy Mikhail Rubinstein\par \par Site	BMD (g/cm2)	T-score	Change previous	Change baseline	\par Lumbar spine	0.597	-4.1\par Femoral neck	0.598	-2.3	\par Total hip	                0.703       -2.0         \par Distal radius           	                \par DXA Comments:

## 2023-01-11 NOTE — HISTORY OF PRESENT ILLNESS
[FreeTextEntry1] : Ms. Rubinstein is a 62 year-old woman with a history of neurofibromatosis 1 presenting for follow-up of osteoporosis. I saw her for an initial visit in June 2022.\par \par Bone History\par Menopause: Age 56\par Osteoporosis diagnosed in 2017 on routine bone density significant for T-scores of -3.4 at the lumbar spine, -1.8 at the femoral neck, -1.9 at the total hip, -0.6 at the distal radius; most recent bone density as below\par Fracture history: None\par Family history: No parental history of hip fracture\par Treatment: None\par \par Falls: Two falls on the street\par Height loss: About 1/2 inch\par Kidney stones: No\par Dental health: Regular appointments, no history of implants, no upcoming procedures planned\par Exercise: Walks at least 30 minutes most days\par Dairy intake: 1/2-1 serving daily (milk with coffee, cheese daily, occasional yogurt)\par Calcium supplements: None; was taking Os-Jayson and will be starting Solgar Calcium Citrate + D\par Multivitamin: Solgar VM-75 with chelated minerals (calcium 20 mg, vitamin D 400 intl units)\par Vitamin D supplements: 50,000 intl units weekly\par \par Osteoporosis risk factors include: Postmenopausal status,  race, prior fracture, falls, height loss, small thin bones, tobacco use, excessive alcohol, anorexia, family history, vitamin D deficiency, corticosteroid use, seizure medications, malabsorption, hyperparathyroidism, hyperthyroidism.\par NEGATIVE EXCEPT: Postmenopausal status,  race\par \par Interim History \par Recent laboratory results as below. Red blood cell count, hemoglobin, and potassium borderline elevated likely due to dehydration. Serum protein electrophoresis with a monoclonal protein. I referred her to hematology providers within the [facility] system. We discussed the risks of antiresorptive therapy, including but not limited to osteonecrosis of the jaw and atypical femoral fracture.\par She was most amenable to denosumab therapy and presents today for her first dose.\par She has seen multiple providers; notes reviewed. She has been diagnosed with monoclonal gammopathy of undetermined significance. \par She feels well today. She is scheduled for upcoming knee surgery on August 2nd. \par Medical and surgical history, medications, allergies, social and family history reviewed and updated as needed.

## 2023-01-23 ENCOUNTER — APPOINTMENT (OUTPATIENT)
Dept: ORTHOPEDIC SURGERY | Facility: CLINIC | Age: 63
End: 2023-01-23
Payer: COMMERCIAL

## 2023-01-23 PROCEDURE — 99214 OFFICE O/P EST MOD 30 MIN: CPT

## 2023-01-23 NOTE — PHYSICAL EXAM
[Slightly Antalgic] : slightly antalgic [LE] : Sensory: Intact in bilateral lower extremities [Normal RLE] : Right Lower Extremity: No scars, rashes, lesions, ulcers, skin intact [Normal LLE] : Left Lower Extremity: No scars, rashes, lesions, ulcers, skin intact [Normal Touch] : sensation intact for touch [Normal] : Alert and in no acute distress [de-identified] : Left knee\par Mildly antalgic gait walking on a flexed left knee and valgus malalignment.  Flexion contracture left knee\par Left knee range of motion is with about a 15-20 degree flexion contracture and flexion to 95 degrees with discomfort at extremes of motion\par Right knee range of motion is from almost full extension to 125 to 130 degrees flexion without significant pain\par Tender mildly medial and lateral joint lines left knee.\par Varicose veins left lower leg.  There is several red splotchy skin lesions in the lower leg not raised.  I recommended she see the dermatologist for follow-up on these.\par There is no severe focal tenderness in the left lower leg.\par No pitting edema but there may be mild edema through the lower leg.  No lower leg deformity or crepitus.  Normal varus and valgus laxity.  Ia Lachman.\par - Reese left knee.\par Motor and sensation are intact distally [de-identified] : Varicose veins [de-identified] : Overweight [de-identified] : \par X-rays left knee November 2022 compared to bilateral knees weightbearing 4 views AP, lateral, merchant and Avila views May 26, 2022  showed severe bone-on-bone arthritis in the right knee patellofemoral joint and moderate to severe in the left knee and medial and lateral joint productive changes greater right than left.  Left knee medial and lateral joint spaces are relatively well-maintained.  No fractures or acute changes seen\par \par MRI of the left knee performed December 23, 2021 showed severe lateral patellofemoral arthritis, severe mucoid degeneration ACL, small complex tear posterior horn medial meniscus, complex tear posterior horn and body of the lateral meniscus, 10 mm area of severe chondral loss posterior weightbearing lateral femoral condyle, moderate popliteal cyst and small joint effusion.

## 2023-01-23 NOTE — PHYSICAL EXAM
[Slightly Antalgic] : slightly antalgic [LE] : Sensory: Intact in bilateral lower extremities [Normal RLE] : Right Lower Extremity: No scars, rashes, lesions, ulcers, skin intact [Normal LLE] : Left Lower Extremity: No scars, rashes, lesions, ulcers, skin intact [Normal Touch] : sensation intact for touch [Normal] : Alert and in no acute distress [de-identified] : Left knee\par Mildly antalgic gait walking on a flexed left knee and valgus malalignment.  Flexion contracture left knee\par Left knee range of motion is with about a 15-20 degree flexion contracture and flexion to 95 degrees with discomfort at extremes of motion\par Right knee range of motion is from almost full extension to 125 to 130 degrees flexion without significant pain\par Tender mildly medial and lateral joint lines left knee.\par Varicose veins left lower leg.  There is several red splotchy skin lesions in the lower leg not raised.  I recommended she see the dermatologist for follow-up on these.\par There is no severe focal tenderness in the left lower leg.\par No pitting edema but there may be mild edema through the lower leg.  No lower leg deformity or crepitus.  Normal varus and valgus laxity.  Ia Lachman.\par - Reese left knee.\par Motor and sensation are intact distally [de-identified] : Varicose veins [de-identified] : Overweight [de-identified] : \par X-rays left knee November 2022 compared to bilateral knees weightbearing 4 views AP, lateral, merchant and Avila views May 26, 2022  showed severe bone-on-bone arthritis in the right knee patellofemoral joint and moderate to severe in the left knee and medial and lateral joint productive changes greater right than left.  Left knee medial and lateral joint spaces are relatively well-maintained.  No fractures or acute changes seen\par \par MRI of the left knee performed December 23, 2021 showed severe lateral patellofemoral arthritis, severe mucoid degeneration ACL, small complex tear posterior horn medial meniscus, complex tear posterior horn and body of the lateral meniscus, 10 mm area of severe chondral loss posterior weightbearing lateral femoral condyle, moderate popliteal cyst and small joint effusion.

## 2023-01-23 NOTE — HISTORY OF PRESENT ILLNESS
[de-identified] : 63 year old woman is 5 1/2 months post op status post LEFT knee arthroscopy, partial medial and lateral meniscectomy and chondroplasty.\par She has ongoing stiffness in the knee which is feeling a little bit better.  She can walk on level ground okay.  Stairs can still be challenging and she is slow.  She can squat down somewhat better.  She cannot kneel if she kneels on a pillow but otherwise it can hurt in the front of the knee.  It stiff if she sits for a long time.\par She has been working.\par She does a little bit of exercises on her own but nothing consistent and has not been in physical therapy recently
[de-identified] : 63 year old woman is 5 1/2 months post op status post LEFT knee arthroscopy, partial medial and lateral meniscectomy and chondroplasty.\par She has ongoing stiffness in the knee which is feeling a little bit better.  She can walk on level ground okay.  Stairs can still be challenging and she is slow.  She can squat down somewhat better.  She cannot kneel if she kneels on a pillow but otherwise it can hurt in the front of the knee.  It stiff if she sits for a long time.\par She has been working.\par She does a little bit of exercises on her own but nothing consistent and has not been in physical therapy recently
Cardiac

## 2023-01-23 NOTE — ASSESSMENT
[FreeTextEntry1] : 63-year-old with left knee pain and stiffness presents for follow-up knee arthritis.  Her pain is less but the stiffness is about the same.  She has more loss of motion and would expect.  After the surgery.  At the time of surgery was full-thickness cartilage loss on the patella and lateral femoral condyle.  Ultimately I think a knee replacement will be indicated.  She is going to try to get back into physical therapy to work on motion and I encouraged her to ride a stationary bike and to work on motion and do leg lifts for strengthening.\par Heat and ice as needed.  Tylenol or ibuprofen as needed.  We can do injections in the future for pain until she is ready for knee replacement.\par Follow-up in about 2 months

## 2023-01-31 ENCOUNTER — APPOINTMENT (OUTPATIENT)
Dept: ENDOCRINOLOGY | Facility: CLINIC | Age: 63
End: 2023-01-31
Payer: COMMERCIAL

## 2023-01-31 PROCEDURE — 96401 CHEMO ANTI-NEOPL SQ/IM: CPT

## 2023-01-31 RX ORDER — DENOSUMAB 60 MG/ML
60 INJECTION SUBCUTANEOUS
Qty: 60 | Refills: 0 | Status: COMPLETED | OUTPATIENT
Start: 2023-01-31

## 2023-01-31 RX ADMIN — DENOSUMAB 0 MG/ML: 60 INJECTION SUBCUTANEOUS at 00:00

## 2023-01-31 NOTE — ASSESSMENT
[FreeTextEntry1] : 1. Osteoporosis\par Patient of Dr. Shelley\par Presents for prolia injection, last (initial) injection, 7/20/2022\par Prolia authorized through medical benefit-- no auth required through this benefit\par May need implant un upcoming months, discussed waiting ~4 months to proceed to permit healing time, post-implant of ~6-8 weeks before resuming for next prolia dose\par -- 60 mg denosumab (Prolia) NDC: 27646-214-91, SN: [id number], LOT [id number], exp 03/31/2024 injected subcutaneously into left upper arm without complication\par -- Patient aware that next injection is due in 6 months and will return for this injection\par \par Follow-up in 6 months with Dr. Shelley for annual visit and prolia injection\par \par Lynnette Barbosa, MS, FNP-BC, CDCES\par 01/31/2023\par \par

## 2023-03-25 NOTE — ASU DISCHARGE PLAN (ADULT/PEDIATRIC) - PHYSICIAN SECTION COMPLETE
"    Established Patient    Shaka presents today with the following:    CC: Follow-up for weight management, chronic medical issues    HPI:   Shaka is a 35 y.o. male who came in for above.    He has started half a tablet of phentermine 37.5 mg.  He does have dry mouth which is tolerable with hydration.  No palpitation, insomnia.  He definitely finds it helpful to keep his energy up without energy drinks, allowing him to exercise more, be more motivated.  He feels full easier, so cutting down food intake.      ROS:   As above    Patient Active Problem List    Diagnosis Date Noted    Prediabetes 03/24/2023    Dyslipidemia 03/24/2023    Weight gain 04/20/2020    Binge eating 04/20/2020    Gastroesophageal reflux disease without esophagitis 04/20/2020    Chronic fatigue 04/20/2020    Sleeping difficulty 04/20/2020    Snoring 02/28/2020    Migraine without aura and without status migrainosus, not intractable 02/28/2020    Other male erectile dysfunction 02/28/2020    Obesity (BMI 30.0-34.9) 02/28/2020    Nasal congestion 02/28/2020    Adjustment reaction with anxiety and depression 03/22/2018       Current Outpatient Medications   Medication Sig Dispense Refill    [START ON 4/5/2023] phentermine (ADIPEX-P) 37.5 MG tablet Take 1 Tablet by mouth every morning before breakfast for 30 days. 30 Tablet 0    terbinafine (LAMISIL) 250 MG Tab Take 1 Tablet by mouth every day. 90 Tablet 0    sildenafil citrate (VIAGRA) 50 MG tablet Take 1 Tablet by mouth 1 time a day as needed for Erectile Dysfunction. 10 Tablet 3     No current facility-administered medications for this visit.         /80 (Patient Position: Sitting)   Pulse 75   Temp 36.4 °C (97.6 °F) (Temporal)   Resp 16   Ht 1.88 m (6' 2\")   Wt (!) 132 kg (292 lb)   SpO2 97%   BMI 37.49 kg/m²     Physical Exam  General: Alert and oriented, No apparent distress.  Lungs: Clear to auscultation bilaterally without any wheezing, crepitations.  Cardiovascular: Regular " rate and rhythm. No murmurs, rubs or gallops.  Extremities: No edema.      Assessment and Plan    1. Prediabetes  2. Dyslipidemia  -Target on weight management, healthy lifestyle.  Continuing calorie restriction and regular exercises, going to the gym.    3. Class 3 obesity (HCC)   reviewed.  Increase phentermine to a whole tablet per day.  - phentermine (ADIPEX-P) 37.5 MG tablet; Take 1 Tablet by mouth every morning before breakfast for 30 days.  Dispense: 30 Tablet; Refill: 0    4. Snoring  -Recommended to schedule sleep medicine appointment.      Return in about 6 weeks (around 5/5/2023).           Signed by: Janneth John M.D.     Yes

## 2023-06-12 ENCOUNTER — APPOINTMENT (OUTPATIENT)
Dept: ORTHOPEDIC SURGERY | Facility: CLINIC | Age: 63
End: 2023-06-12
Payer: COMMERCIAL

## 2023-06-12 DIAGNOSIS — M17.0 BILATERAL PRIMARY OSTEOARTHRITIS OF KNEE: ICD-10-CM

## 2023-06-12 DIAGNOSIS — M24.562 CONTRACTURE, LEFT KNEE: ICD-10-CM

## 2023-06-12 DIAGNOSIS — M21.062 VALGUS DEFORMITY, NOT ELSEWHERE CLASSIFIED, LEFT KNEE: ICD-10-CM

## 2023-06-12 PROCEDURE — 73564 X-RAY EXAM KNEE 4 OR MORE: CPT | Mod: LT

## 2023-06-12 PROCEDURE — 99214 OFFICE O/P EST MOD 30 MIN: CPT

## 2023-06-12 NOTE — HISTORY OF PRESENT ILLNESS
[de-identified] : 63 year old woman is 10 1/2 months post op status post LEFT knee arthroscopy, partial medial and lateral meniscectomy and chondroplasty.\par She comes in today for follow-up for her knee.  She states that she had changed jobs and had [employer] for a while but now has insurance again.  She gets pain intermittently that 7 out of 10 in the knee.  About 3 or 4 times a week she will take Celebrex or Tylenol and other days she takes nothing.  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  Stairs are difficult.  She does a little bit of exercise on her own but not regularly.  She states that her knee is better now than it had been and she feels like it is getting a little better recently.  She walks without assistive devices.\par We had done injections last year of hyaluronic acid and steroids.  She was thinking another injection would help.  She is not at a point she would consider knee replacement in terms of the pain or loss of function.  She also is going to be getting dental work done and needs to do that first.

## 2023-06-12 NOTE — ASSESSMENT
[FreeTextEntry1] :  63-year-old with severe left knee osteoarthritis severe in the patellofemoral compartment but also lateral compartment with progressively worsening osteoarthritis with valgus in flexion contracture ongoing.\par She would not be able to do a knee replacement for a while because she is getting dental work.  She also just started a new job.  She does not feel she will do more physical therapy and home exercises.  Celebrex or Tylenol as needed.\par I gave her names of knee replacement specialist because I think at some point that is likely where she is heading to try to get back her motion in the knee and better gait and function.  Like she needs knee replacement at this time given her quality of life.  She will do more physical therapy formally and I will order the hyaluronic acid injections which we did about a year ago and provided some partial relief.  We will try a different brand at this time, you flex the if it is approved.\par

## 2023-06-12 NOTE — PHYSICAL EXAM
[LE] : Sensory: Intact in bilateral lower extremities [Normal RLE] : Right Lower Extremity: No scars, rashes, lesions, ulcers, skin intact [Normal LLE] : Left Lower Extremity: No scars, rashes, lesions, ulcers, skin intact [Normal Touch] : sensation intact for touch [Normal] : Oriented to person, place, and time, insight and judgement were intact and the affect was normal [DP] : dorsalis pedis 2+ and symmetric bilaterally [de-identified] : Left knee \par Mildly antalgic gait walking on a flexed left knee and valgus malalignment. \par Flexion contracture left knee Left knee range of motion is with about a 15-20 degree flexion contracture and flexion to 95 degrees with discomfort at extremes of motion \par Right knee range of motion is from almost full extension to 125 to 130 degrees flexion without significant pain\par  Tender mildly medial and lateral joint lines left knee. Tender patella facets.\par Varicose veins left lower leg.\par Normal varus and valgus laxity.  Negative anterior posterior drawer.  Negative Reese. [de-identified] : \par \par X-rays left knee today compared to November 2022 bilateral knees weightbearing 4 views AP, lateral, merchant and Avila views showed severe bone-on-bone arthritis in the left knee patellofemoral joint and moderate increased narrowing lateral compartment with productive changes.  KL 4\par \par MRI of the left knee performed December 23, 2021 showed severe lateral patellofemoral arthritis, severe mucoid degeneration ACL, small complex tear posterior horn medial meniscus, complex tear posterior horn and body of the lateral meniscus, 10 mm area of severe chondral loss posterior weightbearing lateral femoral condyle, moderate popliteal cyst and small joint effusion. \par

## 2023-06-14 ENCOUNTER — NON-APPOINTMENT (OUTPATIENT)
Age: 63
End: 2023-06-14

## 2023-06-16 ENCOUNTER — NON-APPOINTMENT (OUTPATIENT)
Age: 63
End: 2023-06-16

## 2023-06-30 ENCOUNTER — NON-APPOINTMENT (OUTPATIENT)
Age: 63
End: 2023-06-30

## 2023-07-21 ENCOUNTER — APPOINTMENT (OUTPATIENT)
Dept: ORTHOPEDIC SURGERY | Facility: CLINIC | Age: 63
End: 2023-07-21
Payer: COMMERCIAL

## 2023-07-21 PROCEDURE — 20610 DRAIN/INJ JOINT/BURSA W/O US: CPT | Mod: LT

## 2023-07-21 NOTE — PROCEDURE
[Injection] : Injection [Left] : of the left [Knee Joint] : knee joint [Osteoarthritis] : Osteoarthritis [Patient] : patient [Risk] : risk [Benefits] : benefits [Alternatives] : alternatives [Bleeding] : bleeding [Infection] : infection [Allergic Reaction] : allergic reaction [Verbal Consent Obtained] : verbal consent was obtained prior to the procedure [Alcohol] : Alcohol [Ethyl Chloride Spray] : ethyl chloride spray was used as a topical anesthetic [Lateral] : lateral [22] : a 22-gauge [2 mL Euflexxa___(lot #)] : 2mL Euflexxa ~Ulot# [unfilled] [Bandage Applied] : a bandage [Tolerated Well] : The patient tolerated the procedure well [None] : none [No Strenuous Activity___day(s)] : avoid strenuous activity for [unfilled] day(s) [___ Week(s)] : in [unfilled] week(s) [de-identified] : LEFT knee\par Euflexxa #1\par Lot # [id number]\par exp 8/12/24 [FreeTextEntry1] : chloraprep

## 2023-08-04 ENCOUNTER — APPOINTMENT (OUTPATIENT)
Dept: ORTHOPEDIC SURGERY | Facility: CLINIC | Age: 63
End: 2023-08-04
Payer: COMMERCIAL

## 2023-08-04 ENCOUNTER — NON-APPOINTMENT (OUTPATIENT)
Age: 63
End: 2023-08-04

## 2023-08-04 PROCEDURE — 20610 DRAIN/INJ JOINT/BURSA W/O US: CPT | Mod: LT

## 2023-08-04 NOTE — PROCEDURE
[de-identified] : LEFT knee\par Euflexxa #2\par Lot # [id number]\par exp 8/12/24 [FreeTextEntry1] : chloraprep

## 2023-08-08 PROBLEM — M17.12 ARTHRITIS OF KNEE, LEFT: Status: ACTIVE | Noted: 2022-01-14

## 2023-08-09 ENCOUNTER — APPOINTMENT (OUTPATIENT)
Dept: ENDOCRINOLOGY | Facility: CLINIC | Age: 63
End: 2023-08-09
Payer: COMMERCIAL

## 2023-08-09 VITALS
WEIGHT: 153 LBS | HEART RATE: 73 BPM | DIASTOLIC BLOOD PRESSURE: 67 MMHG | BODY MASS INDEX: 30.9 KG/M2 | SYSTOLIC BLOOD PRESSURE: 113 MMHG

## 2023-08-09 DIAGNOSIS — M81.0 AGE-RELATED OSTEOPOROSIS W/OUT CURRENT PATHOLOGICAL FRACTURE: ICD-10-CM

## 2023-08-09 DIAGNOSIS — Q85.01 NEUROFIBROMATOSIS, TYPE 1: ICD-10-CM

## 2023-08-09 PROCEDURE — 99214 OFFICE O/P EST MOD 30 MIN: CPT | Mod: 25

## 2023-08-09 PROCEDURE — 96401 CHEMO ANTI-NEOPL SQ/IM: CPT

## 2023-08-09 RX ORDER — HYALURONATE SODIUM, STABILIZED 88 MG/4 ML
88 SYRINGE (ML) INTRAARTICULAR
Qty: 1 | Refills: 0 | Status: DISCONTINUED | OUTPATIENT
Start: 2022-01-14 | End: 2023-08-09

## 2023-08-09 RX ORDER — CALCIUM CITRATE/VITAMIN D3 315MG-6.25
TABLET ORAL
Refills: 0 | Status: ACTIVE | COMMUNITY

## 2023-08-09 RX ORDER — HYALURONATE SODIUM 20 MG/2 ML
20 SYRINGE (ML) INTRAARTICULAR
Qty: 1 | Refills: 0 | Status: DISCONTINUED | OUTPATIENT
Start: 2023-06-12 | End: 2023-08-09

## 2023-08-09 RX ORDER — DENOSUMAB 60 MG/ML
60 INJECTION SUBCUTANEOUS
Qty: 1 | Refills: 0 | Status: COMPLETED | OUTPATIENT
Start: 2023-08-09

## 2023-08-09 RX ADMIN — DENOSUMAB 0 MG/ML: 60 INJECTION SUBCUTANEOUS at 00:00

## 2023-08-10 NOTE — ADDENDUM
[FreeTextEntry1] : Procedure Note: Denosumab 60 mg SC administered into left deltoid area. Zara Shelley MD  Received laboratory results ordered by Dr. Lake Mcgee significant for the below; see scanned results. Serum calcium, renal function, and vitamin D within the normal range. 8/10/23  Laboratories (January 3, 2023) reviewed and significant for:  Unremarkable complete blood count Calcium 9.3 mg/dL (albumin 4.5 g/dL) BUN/creatinine 18/0.52 mg/dL (eGFR 120 mL/min) Alkaline phosphatase 71 U/L TSH 1.73 uIU/mL 25-hydroxyvitamin D 34.8 ng/mL

## 2023-08-10 NOTE — ASSESSMENT
[FreeTextEntry1] : Osteoporosis. She has no history of fragility fracture. She has received denosumab from July 2022 to present. Metabolic evaluation for secondary causes of bone loss remarkable for monoclonal gammopathy of undetermined significance. We have discussed the potential benefits and risks of antiresorptive osteoporosis therapy, including but not limited to osteonecrosis of the jaw and atypical femoral fracture. She is tolerating denosumab and we will continue. We have discussed that denosumab must be dosed every 6 months due to rebound increase in bone breakdown with abrupt discontinuation of therapy, with transition to bisphosphonate therapy prior to a "drug holiday." Continue denosumab 60 mg SC every 6 months; dose administered today Calcium 4237-2467 mg daily from diet and supplements (to be taken in divided doses as no more than 500-600 mg can be absorbed at one time); continue current regimen Continue current vitamin D regimen Diet, exercise and fall prevention discussed  History of neurofibromatosis type 1. She has no symptoms of pheochromocytoma and screening is not necessary at this time.   I reviewed the DXA performed on December 15, 2021 with the patient today. I reviewed the laboratories performed on November 7, 2022 with the patient today.  I counseled the patient regarding calcium and vitamin D intake today. I discussed the following osteoporosis therapies: Denosumab  CC: Dr. Lake Mcgee, Fax [phone]

## 2023-08-10 NOTE — HISTORY OF PRESENT ILLNESS
[FreeTextEntry1] : Ms. Rubinstein is a 63 year-old woman with a history of neurofibromatosis 1 presenting for follow-up of osteoporosis. I saw her for an initial visit in June 2022 and last in July 2022.  Bone History Menopause: Age 56 Osteoporosis diagnosed in 2017 on routine bone density significant for T-scores of -3.4 at the lumbar spine, -1.8 at the femoral neck, -1.9 at the total hip, -0.6 at the distal radius; most recent bone density as below Fracture history: None Family history: No parental history of hip fracture Treatment: Denosumab 60 mg SC in July 2022, January 2023  Falls: None recent Height loss: About 1/2 inch Kidney stones: No Dental health: Regular appointments, history of implant, no upcoming procedures planned Exercise: Walks; working late Dairy intake: 1/2-1 serving daily (milk with coffee, cheese daily, occasional yogurt) Calcium supplements: Solgar Calcium Citrate + D 500 mg daily Multivitamin: Solgar VM-75 with chelated minerals (calcium 20 mg, vitamin D 400 intl units) Vitamin D supplements: 50,000 intl units weekly  Osteoporosis risk factors include: Postmenopausal status,  race, prior fracture, falls, height loss, small thin bones, tobacco use, excessive alcohol, anorexia, family history, vitamin D deficiency, corticosteroid use, seizure medications, malabsorption, hyperparathyroidism, hyperthyroidism. NEGATIVE EXCEPT: Postmenopausal status,  race  Interim History  She has received denosumab from July 2022 to present. She presents today for denosumab injection.  She is status post a partial dental implant with good healing.  She has seen multiple providers; notes reviewed. Last laboratory results reviewed. Serum calcium and renal function within the normal range. She feels well today. She has started a new position. She is scheduled for an upcoming knee injection. No episodes of headache, sweating, and tachycardia. Blood pressure has been under good control.  Medical and surgical history, medications, allergies, social and family history reviewed and updated as needed.

## 2023-08-10 NOTE — DATA REVIEWED
[FreeTextEntry1] : Most recent bone mineral density Date: December 15, 2021 Source: Hologic Site: [facility] - under Nancy Mikhail Rubinstein  Site	BMD (g/cm2)	T-score	Change previous	Change baseline	 Lumbar spine	0.597	-4.1 Femoral neck	0.598	-2.3	 Total hip	        0.703       -2.0          Distal radius           	                 DXA Comments:

## 2023-08-10 NOTE — PHYSICAL EXAM
[Alert] : alert [Healthy Appearance] : healthy appearance [No Acute Distress] : no acute distress [Normal Sclera/Conjunctiva] : normal sclera/conjunctiva [Normal Hearing] : hearing was normal [No Respiratory Distress] : no respiratory distress [No Spinal Tenderness] : no spinal tenderness [No Stigmata of Cushings Syndrome] : no stigmata of Cushings Syndrome [Normal Gait] : normal gait [Normal Insight/Judgement] : insight and judgment were intact [Kyphosis] : no kyphosis present [Scoliosis] : no scoliosis [Acanthosis Nigricans] : no acanthosis nigricans [de-identified] : no moon facies, no supraclavicular fat pads

## 2023-08-11 ENCOUNTER — APPOINTMENT (OUTPATIENT)
Dept: ORTHOPEDIC SURGERY | Facility: CLINIC | Age: 63
End: 2023-08-11
Payer: COMMERCIAL

## 2023-08-11 DIAGNOSIS — M17.12 UNILATERAL PRIMARY OSTEOARTHRITIS, LEFT KNEE: ICD-10-CM

## 2023-08-11 PROCEDURE — 20610 DRAIN/INJ JOINT/BURSA W/O US: CPT | Mod: LT

## 2023-09-21 ENCOUNTER — NON-APPOINTMENT (OUTPATIENT)
Age: 63
End: 2023-09-21

## 2023-10-02 ENCOUNTER — APPOINTMENT (OUTPATIENT)
Dept: HEMATOLOGY ONCOLOGY | Facility: CLINIC | Age: 63
End: 2023-10-02

## 2023-10-31 ENCOUNTER — APPOINTMENT (OUTPATIENT)
Dept: HEMATOLOGY ONCOLOGY | Facility: CLINIC | Age: 63
End: 2023-10-31
Payer: COMMERCIAL

## 2023-10-31 VITALS
TEMPERATURE: 97.8 F | WEIGHT: 152 LBS | HEIGHT: 59 IN | DIASTOLIC BLOOD PRESSURE: 82 MMHG | BODY MASS INDEX: 30.64 KG/M2 | SYSTOLIC BLOOD PRESSURE: 140 MMHG | OXYGEN SATURATION: 97 % | HEART RATE: 87 BPM

## 2023-10-31 DIAGNOSIS — R71.8 OTHER ABNORMALITY OF RED BLOOD CELLS: ICD-10-CM

## 2023-10-31 DIAGNOSIS — D69.6 THROMBOCYTOPENIA, UNSPECIFIED: ICD-10-CM

## 2023-10-31 DIAGNOSIS — E55.9 VITAMIN D DEFICIENCY, UNSPECIFIED: ICD-10-CM

## 2023-10-31 DIAGNOSIS — U07.1 COVID-19: ICD-10-CM

## 2023-10-31 DIAGNOSIS — D47.2 MONOCLONAL GAMMOPATHY: ICD-10-CM

## 2023-10-31 PROCEDURE — 99214 OFFICE O/P EST MOD 30 MIN: CPT | Mod: 25

## 2023-10-31 PROCEDURE — 36415 COLL VENOUS BLD VENIPUNCTURE: CPT

## 2023-11-01 LAB
25(OH)D3 SERPL-MCNC: 47.1 NG/ML
ALBUMIN SERPL ELPH-MCNC: 4.6 G/DL
ALP BLD-CCNC: 76 U/L
ALT SERPL-CCNC: 27 U/L
ANION GAP SERPL CALC-SCNC: 12 MMOL/L
AST SERPL-CCNC: 16 U/L
B2 MICROGLOB SERPL-MCNC: 1.6 MG/L
BASOPHILS # BLD AUTO: 0.08 K/UL
BASOPHILS # BLD AUTO: 0.08 K/UL
BASOPHILS NFR BLD AUTO: 0.9 %
BASOPHILS NFR BLD AUTO: 0.9 %
BILIRUB SERPL-MCNC: 0.3 MG/DL
BUN SERPL-MCNC: 19 MG/DL
CALCIUM SERPL-MCNC: 9.3 MG/DL
CHLORIDE SERPL-SCNC: 103 MMOL/L
CO2 SERPL-SCNC: 24 MMOL/L
CREAT SERPL-MCNC: 0.5 MG/DL
CRP SERPL-MCNC: 10 MG/L
EGFR: 106 ML/MIN/1.73M2
EOSINOPHIL # BLD AUTO: 0 K/UL
EOSINOPHIL # BLD AUTO: 0 K/UL
EOSINOPHIL NFR BLD AUTO: 0 %
EOSINOPHIL NFR BLD AUTO: 0 %
ERYTHROCYTE [SEDIMENTATION RATE] IN BLOOD BY WESTERGREN METHOD: 18 MM/HR
GLUCOSE SERPL-MCNC: 86 MG/DL
HCT VFR BLD CALC: 46.9 %
HGB BLD-MCNC: 15 G/DL
LDH SERPL-CCNC: 203 U/L
LYMPHOCYTES # BLD AUTO: 0.97 K/UL
LYMPHOCYTES # BLD AUTO: 0.97 K/UL
LYMPHOCYTES NFR BLD AUTO: 11.3 %
LYMPHOCYTES NFR BLD AUTO: 11.3 %
MAN DIFF?: NORMAL
MCHC RBC-ENTMCNC: 27.6 PG
MCHC RBC-ENTMCNC: 32 GM/DL
MCV RBC AUTO: 86.4 FL
MONOCYTES # BLD AUTO: 0.22 K/UL
MONOCYTES # BLD AUTO: 0.22 K/UL
MONOCYTES NFR BLD AUTO: 2.6 %
MONOCYTES NFR BLD AUTO: 2.6 %
MSMEAR: NORMAL
NEUTROPHILS # BLD AUTO: 7.31 K/UL
NEUTROPHILS # BLD AUTO: 7.31 K/UL
NEUTROPHILS NFR BLD AUTO: 85.2 %
NEUTROPHILS NFR BLD AUTO: 85.2 %
OVALOCYTES BLD QL SMEAR: SLIGHT
PLAT MORPH BLD: NORMAL
PLATELET # BLD AUTO: 212 K/UL
POIKILOCYTOSIS BLD QL SMEAR: SLIGHT
POTASSIUM SERPL-SCNC: 4.5 MMOL/L
PROT SERPL-MCNC: 7.1 G/DL
RBC # BLD: 5.43 M/UL
RBC # BLD: 5.43 M/UL
RBC # FLD: 13.7 %
RBC BLD AUTO: ABNORMAL
RETICS # AUTO: 1.3 %
RETICS AGGREG/RBC NFR: 70.6 K/UL
SODIUM SERPL-SCNC: 139 MMOL/L
SPHEROCYTES BLD QL SMEAR: SLIGHT
URATE SERPL-MCNC: 3.3 MG/DL
WBC # FLD AUTO: 8.58 K/UL

## 2023-11-05 LAB
ALBUMIN MFR SERPL ELPH: 57.4 %
ALBUMIN SERPL-MCNC: 4.1 G/DL
ALBUMIN/GLOB SERPL: 1.4 RATIO
ALPHA1 GLOB MFR SERPL ELPH: 4.5 %
ALPHA1 GLOB SERPL ELPH-MCNC: 0.3 G/DL
ALPHA2 GLOB MFR SERPL ELPH: 12.6 %
ALPHA2 GLOB SERPL ELPH-MCNC: 0.9 G/DL
B-GLOBULIN MFR SERPL ELPH: 12.6 %
B-GLOBULIN SERPL ELPH-MCNC: 0.9 G/DL
DEPRECATED KAPPA LC FREE/LAMBDA SER: 1.83 RATIO
GAMMA GLOB FLD ELPH-MCNC: 0.9 G/DL
GAMMA GLOB MFR SERPL ELPH: 12.9 %
IGA SER QL IEP: 330 MG/DL
IGG SER QL IEP: 905 MG/DL
IGM SER QL IEP: 128 MG/DL
INTERPRETATION SERPL IEP-IMP: NORMAL
KAPPA LC CSF-MCNC: 0.96 MG/DL
KAPPA LC SERPL-MCNC: 1.76 MG/DL
M PROTEIN MFR SERPL ELPH: 2.9 %
M PROTEIN SPEC IFE-MCNC: NORMAL
MONOCLON BAND OBS SERPL: 0.2 G/DL
PROT SERPL-MCNC: 7.1 G/DL
PROT SERPL-MCNC: 7.1 G/DL

## 2023-11-20 ENCOUNTER — NON-APPOINTMENT (OUTPATIENT)
Age: 63
End: 2023-11-20

## 2024-02-14 ENCOUNTER — APPOINTMENT (OUTPATIENT)
Dept: ENDOCRINOLOGY | Facility: CLINIC | Age: 64
End: 2024-02-14
Payer: COMMERCIAL

## 2024-02-14 PROCEDURE — 96401 CHEMO ANTI-NEOPL SQ/IM: CPT

## 2024-02-14 RX ORDER — DENOSUMAB 60 MG/ML
60 INJECTION SUBCUTANEOUS
Qty: 1 | Refills: 0 | Status: COMPLETED | OUTPATIENT
Start: 2024-02-14

## 2024-02-14 RX ADMIN — DENOSUMAB 60 MG/ML: 60 INJECTION SUBCUTANEOUS at 00:00

## 2024-05-19 ENCOUNTER — NON-APPOINTMENT (OUTPATIENT)
Age: 64
End: 2024-05-19

## 2024-07-16 ENCOUNTER — NON-APPOINTMENT (OUTPATIENT)
Age: 64
End: 2024-07-16

## 2024-08-16 ENCOUNTER — APPOINTMENT (OUTPATIENT)
Dept: ENDOCRINOLOGY | Facility: CLINIC | Age: 64
End: 2024-08-16

## 2024-08-16 VITALS
HEIGHT: 59 IN | HEART RATE: 76 BPM | DIASTOLIC BLOOD PRESSURE: 70 MMHG | WEIGHT: 146 LBS | BODY MASS INDEX: 29.43 KG/M2 | SYSTOLIC BLOOD PRESSURE: 117 MMHG

## 2024-08-16 DIAGNOSIS — M81.0 AGE-RELATED OSTEOPOROSIS W/OUT CURRENT PATHOLOGICAL FRACTURE: ICD-10-CM

## 2024-08-16 DIAGNOSIS — Q85.01 NEUROFIBROMATOSIS, TYPE 1: ICD-10-CM

## 2024-08-16 PROCEDURE — 99214 OFFICE O/P EST MOD 30 MIN: CPT | Mod: 25

## 2024-08-16 PROCEDURE — 96401 CHEMO ANTI-NEOPL SQ/IM: CPT

## 2024-08-16 PROCEDURE — G2211 COMPLEX E/M VISIT ADD ON: CPT | Mod: NC

## 2024-08-16 RX ORDER — DENOSUMAB 60 MG/ML
60 INJECTION SUBCUTANEOUS
Qty: 1 | Refills: 0 | Status: COMPLETED | OUTPATIENT
Start: 2024-08-16

## 2024-08-16 RX ADMIN — DENOSUMAB 60 MG/ML: 60 INJECTION SUBCUTANEOUS at 00:00

## 2024-08-16 NOTE — HISTORY OF PRESENT ILLNESS
[FreeTextEntry1] : Ms. Rubinstein is a 64 year-old woman with a history of neurofibromatosis 1 presenting for follow-up of osteoporosis. I saw her for an initial visit in June 2022 and last in August 2023.  Bone History Menopause: Age 56 Osteoporosis diagnosed in 2017 on routine bone density significant for T-scores of -3.4 at the lumbar spine, -1.8 at the femoral neck, -1.9 at the total hip, -0.6 at the distal radius; most recent bone density as below Fracture history: None Family history: No parental history of hip fracture Treatment: Denosumab 60 mg SC in July 2022, January 2023, August 2023, February 2024  Falls: None recent Height loss: About 1/2 inch Kidney stones: No Dental health: Regular appointments, history of implant, no upcoming procedures planned Exercise: Walks; working late Dairy intake: 1/2-1 serving daily (milk with coffee, cheese daily, occasional yogurt) Calcium supplements: Solgar Calcium Citrate + D 500 mg daily Multivitamin: Solgar VM-75 with chelated minerals (calcium 20 mg, vitamin D 400 intl units) Vitamin D supplements: 50,000 intl units weekly  Osteoporosis risk factors include: Postmenopausal status,  race, prior fracture, falls, height loss, small thin bones, tobacco use, excessive alcohol, anorexia, family history, vitamin D deficiency, corticosteroid use, seizure medications, malabsorption, hyperparathyroidism, hyperthyroidism. NEGATIVE EXCEPT: Postmenopausal status,  race  Interim History  She has received denosumab from July 2022 to present.  Recent bone density as below. There is osteoporosis by the World Health Organization criteria. While not directly comparable, there have been apparent improvements at the lumbar spine and femoral neck from previous. Vertebral fracture assessment without evidence of compression fractures.  She is status post a dental bridge placement.  She has seen Dr. Sharon Epps; note reviewed. Last laboratory results reviewed. Serum calcium, renal function, and 25-hydrioxyvitamin D within the normal range. She has had fatigue. She notes stress related to her job. No episodes of headache, sweating, and tachycardia. Blood pressure has been under good control.  Medical and surgical history, medications, allergies, social and family history reviewed and updated as needed.

## 2024-08-16 NOTE — PHYSICAL EXAM
[Alert] : alert [Healthy Appearance] : healthy appearance [No Acute Distress] : no acute distress [Normal Sclera/Conjunctiva] : normal sclera/conjunctiva [Normal Hearing] : hearing was normal [No Respiratory Distress] : no respiratory distress [No Spinal Tenderness] : no spinal tenderness [No Stigmata of Cushings Syndrome] : no stigmata of Cushings Syndrome [Normal Gait] : normal gait [Normal Insight/Judgement] : insight and judgment were intact [Kyphosis] : no kyphosis present [Scoliosis] : no scoliosis [Acanthosis Nigricans] : no acanthosis nigricans [de-identified] : no moon facies, no supraclavicular fat pads

## 2024-08-16 NOTE — ADDENDUM
[FreeTextEntry1] : Procedure Note: Denosumab 60 mg SC administered into left deltoid area. Zara Shelley MD

## 2024-08-16 NOTE — DATA REVIEWED
[FreeTextEntry1] : Laboratories (January 3, 2023) reviewed and significant for:  Most recent bone mineral density Date: July 5, 2024 Source: TrackIF Site: [facility]  Site BMD T-score Change previous Change baseline  Lumbar spine 0.631 -3.8    Femoral neck 0.665 -1.7    Total hip 0.697 -2.0    Distal radius 0.566 -2.1    DXA comments: Baseline scan at this facility; left hip values Vertebral fracture assessment without evidence of compression fractures T8 to L5 (my read)  Impression: I have personally reviewed the DXA images and report, and I compared these images to a DXA dated December 15, 2021 from [facility]. There is osteoporosis by the World Health Organization criteria. The report from [facility] excluded L1 and L2. While not directly comparable, there have been apparent improvements at the lumbar spine and femoral neck from previous (prior T-scores of -4.1 at the lumbar spine, -2.3 at the femoral neck, and -2.0 at the total hip). Vertebral fracture assessment without evidence of compression fractures.

## 2024-12-20 ENCOUNTER — APPOINTMENT (OUTPATIENT)
Dept: ORTHOPEDIC SURGERY | Facility: CLINIC | Age: 64
End: 2024-12-20
Payer: COMMERCIAL

## 2024-12-20 DIAGNOSIS — M24.562 CONTRACTURE, LEFT KNEE: ICD-10-CM

## 2024-12-20 DIAGNOSIS — M21.062 VALGUS DEFORMITY, NOT ELSEWHERE CLASSIFIED, LEFT KNEE: ICD-10-CM

## 2024-12-20 DIAGNOSIS — M24.561 CONTRACTURE, RIGHT KNEE: ICD-10-CM

## 2024-12-20 DIAGNOSIS — M17.0 BILATERAL PRIMARY OSTEOARTHRITIS OF KNEE: ICD-10-CM

## 2024-12-20 PROCEDURE — 73564 X-RAY EXAM KNEE 4 OR MORE: CPT | Mod: 50

## 2024-12-20 PROCEDURE — 99214 OFFICE O/P EST MOD 30 MIN: CPT

## 2025-02-12 ENCOUNTER — APPOINTMENT (OUTPATIENT)
Dept: INFUSION THERAPY | Facility: CLINIC | Age: 65
End: 2025-02-12

## 2025-02-12 ENCOUNTER — OUTPATIENT (OUTPATIENT)
Dept: OUTPATIENT SERVICES | Facility: HOSPITAL | Age: 65
LOS: 1 days | End: 2025-02-12
Payer: COMMERCIAL

## 2025-02-12 VITALS
RESPIRATION RATE: 16 BRPM | DIASTOLIC BLOOD PRESSURE: 76 MMHG | OXYGEN SATURATION: 96 % | WEIGHT: 151.9 LBS | HEIGHT: 59 IN | HEART RATE: 90 BPM | SYSTOLIC BLOOD PRESSURE: 137 MMHG | TEMPERATURE: 97 F

## 2025-02-12 DIAGNOSIS — Z98.890 OTHER SPECIFIED POSTPROCEDURAL STATES: Chronic | ICD-10-CM

## 2025-02-12 DIAGNOSIS — M81.0 AGE-RELATED OSTEOPOROSIS WITHOUT CURRENT PATHOLOGICAL FRACTURE: ICD-10-CM

## 2025-02-12 LAB
ALBUMIN SERPL ELPH-MCNC: 3.6 G/DL — SIGNIFICANT CHANGE UP (ref 3.3–5)
ALP SERPL-CCNC: 72 U/L — SIGNIFICANT CHANGE UP (ref 40–120)
ALT FLD-CCNC: 32 U/L — SIGNIFICANT CHANGE UP (ref 10–45)
ANION GAP SERPL CALC-SCNC: 4 MMOL/L — LOW (ref 5–17)
AST SERPL-CCNC: 22 U/L — SIGNIFICANT CHANGE UP (ref 10–40)
BILIRUB SERPL-MCNC: 0.6 MG/DL — SIGNIFICANT CHANGE UP (ref 0.2–1.2)
BUN SERPL-MCNC: 18 MG/DL — SIGNIFICANT CHANGE UP (ref 7–23)
CALCIUM SERPL-MCNC: 9.5 MG/DL — SIGNIFICANT CHANGE UP (ref 8.4–10.5)
CHLORIDE SERPL-SCNC: 110 MMOL/L — HIGH (ref 96–108)
CO2 SERPL-SCNC: 28 MMOL/L — SIGNIFICANT CHANGE UP (ref 22–31)
CREAT SERPL-MCNC: 0.4 MG/DL — LOW (ref 0.5–1.3)
EGFR: 110 ML/MIN/1.73M2 — SIGNIFICANT CHANGE UP
GLUCOSE SERPL-MCNC: 122 MG/DL — HIGH (ref 70–99)
POTASSIUM SERPL-MCNC: 5.1 MMOL/L — SIGNIFICANT CHANGE UP (ref 3.5–5.3)
POTASSIUM SERPL-SCNC: 5.1 MMOL/L — SIGNIFICANT CHANGE UP (ref 3.5–5.3)
PROT SERPL-MCNC: 7.5 G/DL — SIGNIFICANT CHANGE UP (ref 6–8.3)
SODIUM SERPL-SCNC: 142 MMOL/L — SIGNIFICANT CHANGE UP (ref 135–145)

## 2025-02-12 PROCEDURE — 96372 THER/PROPH/DIAG INJ SC/IM: CPT

## 2025-02-12 PROCEDURE — 80053 COMPREHEN METABOLIC PANEL: CPT

## 2025-02-12 PROCEDURE — 36415 COLL VENOUS BLD VENIPUNCTURE: CPT

## 2025-02-12 RX ORDER — DENOSUMAB 120 MG/1.7ML
60 INJECTION SUBCUTANEOUS ONCE
Refills: 0 | Status: COMPLETED | OUTPATIENT
Start: 2025-02-12 | End: 2025-02-12

## 2025-02-12 RX ADMIN — DENOSUMAB 60 MILLIGRAM(S): 120 INJECTION SUBCUTANEOUS at 15:20

## 2025-02-14 ENCOUNTER — APPOINTMENT (OUTPATIENT)
Dept: ORTHOPEDIC SURGERY | Facility: CLINIC | Age: 65
End: 2025-02-14

## (undated) DEVICE — PACK KNEE ARTHROSCOPY

## (undated) DEVICE — SOL IRR BAG NS 0.9% 3000ML

## (undated) DEVICE — GLV 7 PROTEXIS (WHITE)

## (undated) DEVICE — GLV 7.5 PROTEXIS (WHITE)

## (undated) DEVICE — Device

## (undated) DEVICE — POSITIONER FOAM EGG CRATE ULNAR 2PCS (PINK)

## (undated) DEVICE — MARKING PEN W RULER

## (undated) DEVICE — TOURNIQUET CUFF 34" DUAL PORT W PLC

## (undated) DEVICE — VENODYNE/SCD SLEEVE CALF MEDIUM

## (undated) DEVICE — SUT NYLON 3-0 18" PS-2